# Patient Record
Sex: FEMALE | Race: WHITE | NOT HISPANIC OR LATINO | Employment: FULL TIME | URBAN - METROPOLITAN AREA
[De-identification: names, ages, dates, MRNs, and addresses within clinical notes are randomized per-mention and may not be internally consistent; named-entity substitution may affect disease eponyms.]

---

## 2017-04-25 ENCOUNTER — OCCUPATIONAL MEDICINE (OUTPATIENT)
Dept: URGENT CARE | Facility: PHYSICIAN GROUP | Age: 22
End: 2017-04-25
Payer: COMMERCIAL

## 2017-04-25 VITALS
OXYGEN SATURATION: 98 % | BODY MASS INDEX: 20.09 KG/M2 | HEIGHT: 66 IN | WEIGHT: 125 LBS | SYSTOLIC BLOOD PRESSURE: 120 MMHG | HEART RATE: 89 BPM | RESPIRATION RATE: 12 BRPM | TEMPERATURE: 98.7 F | DIASTOLIC BLOOD PRESSURE: 58 MMHG

## 2017-04-25 DIAGNOSIS — S46.812A STRAIN OF LEFT TRAPEZIUS MUSCLE, INITIAL ENCOUNTER: Primary | ICD-10-CM
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
## 2017-04-25 PROCEDURE — 99214 OFFICE O/P EST MOD 30 MIN: CPT | Mod: 29 | Performed by: PHYSICIAN ASSISTANT

## 2017-04-25 RX ORDER — CYCLOBENZAPRINE HCL 5 MG
5-10 TABLET ORAL 3 TIMES DAILY PRN
Qty: 30 TAB | Refills: 0 | Status: SHIPPED | OUTPATIENT
Start: 2017-04-25 | End: 2017-09-21

## 2017-04-25 RX ORDER — TRAMADOL HYDROCHLORIDE 50 MG/1
50 TABLET ORAL EVERY 4 HOURS PRN
Qty: 30 TAB | Refills: 0 | Status: SHIPPED | OUTPATIENT
Start: 2017-04-25 | End: 2017-09-21

## 2017-04-25 NOTE — MR AVS SNAPSHOT
"        Niki Painter   2017 4:45 PM   Occupational Medicine   MRN: [id number]    Department:  [facility]   Dept Phone:  [phone]    Description:  Female : 1995   Provider:  Guillermo Dos Santos PA-C           Reason for Visit     Neck Injury left side of neck, left shoulder pain and upper left arm pain x 1 day      Allergies as of 2017     No Known Allergies      You were diagnosed with     Strain of left trapezius muscle, initial encounter   [492700]  -  Primary     Work related injury   [[id number]]         Vital Signs     Blood Pressure Pulse Temperature Respirations Height Weight    120/58 mmHg 89 37.1 °C (98.7 °F) 12 1.676 m (5' 5.98\") 56.7 kg (125 lb)    Body Mass Index Oxygen Saturation Last Menstrual Period Smoking Status          20.19 kg/m2 98% 2017 Never Smoker         Basic Information     Date Of Birth Sex Race Ethnicity Preferred Language    1995 Female White Non- English      Your appointments     2017  8:45 AM   Workers Compensation with [facility]   [facility] [address] [address]   [phone]              Problem List              ICD-10-CM Priority Class Noted - Resolved    LUQ abdominal pain R10.12   2014 - Present    Hx of iron deficiency anemia Z86.2   2014 - Present      Health Maintenance        Date Due Completion Dates    IMM HEP B VACCINE (1 of 3 - Primary Series) 1995 ---    IMM HEP A VACCINE (1 of 2 - Standard Series) 1996 ---    IMM HPV VACCINE (1 of 3 - Female 3 Dose Series) 2006 ---    IMM VARICELLA (CHICKENPOX) VACCINE (1 of 2 - 2 Dose Adolescent Series) 2008 ---    IMM MENINGOCOCCAL VACCINE (MCV4) (1 of 1) 2011 ---    IMM DTaP/Tdap/Td Vaccine (1 - Tdap) 2014 ---    PAP SMEAR 2016 ---            Current Immunizations     No immunizations on file.      Below and/or attached are the medications your provider " expects you to take. Review all of your home medications and newly ordered medications with your provider and/or pharmacist. Follow medication instructions as directed by your provider and/or pharmacist. Please keep your medication list with you and share with your provider. Update the information when medications are discontinued, doses are changed, or new medications (including over-the-counter products) are added; and carry medication information at all times in the event of emergency situations     Allergies:  No Known Allergies          Medications  Valid as of: April 25, 2017 -  7:01 PM    Generic Name Brand Name Tablet Size Instructions for use    Cyclobenzaprine HCl (Tab) FLEXERIL 5 MG Take 1-2 Tabs by mouth 3 times a day as needed.        Norethindrone-Eth Estradiol (Tab) NECON 1/35 (28) 1-35 MG-MCG         Norethindrone-Eth Estradiol   Take  by mouth.        TraMADol HCl (Tab) ULTRAM 50 MG Take 1 Tab by mouth every four hours as needed.        .                 Medicines prescribed today were sent to:     [facility] [address] [place] [address] [place] [address] AT [facility] [address]    [address] [facility] [address]    Phone: [phone] Fax: [phone]    Open 24 Hours?: No      Medication refill instructions:       If your prescription bottle indicates you have medication refills left, it is not necessary to call your provider’s office. Please contact your pharmacy and they will refill your medication.    If your prescription bottle indicates you do not have any refills left, you may request refills at any time through one of the following ways: The online [employer] system (except Urgent Care), by calling your provider’s office, or by asking your pharmacy to contact your provider’s office with a refill request. Medication refills are processed only during regular business hours and may not be available until the next business day. Your provider may request additional information or to have a  follow-up visit with you prior to refilling your medication.   *Please Note: Medication refills are assigned a new Rx number when refilled electronically. Your pharmacy may indicate that no refills were authorized even though a new prescription for the same medication is available at the pharmacy. Please request the medicine by name with the pharmacy before contacting your provider for a refill.        Instructions    RICE for Routine Care of Injuries  The routine care of many injuries includes rest, ice, compression, and elevation (RICE). The RICE strategy is often recommended for injuries to soft tissues, such as a muscle strain, ligament injuries, bruises, and overuse injuries. It can also be used for some bony injuries. Using the RICE strategy can help to relieve pain, lessen swelling, and enable your body to heal.  Rest  Rest is required to allow your body to heal. This usually involves reducing your normal activities and avoiding use of the injured part of your body. Generally, you can return to your normal activities when you are comfortable and have been given permission by your health care provider.  Ice  Icing your injury helps to keep the swelling down, and it lessens pain. Do not apply ice directly to your skin.  · Put ice in a plastic bag.  · Place a towel between your skin and the bag.  · Leave the ice on for 20 minutes, 2-3 times a day.  Do this for as long as you are directed by your health care provider.  Compression  Compression means putting pressure on the injured area. Compression helps to keep swelling down, gives support, and helps with discomfort. Compression may be done with an elastic bandage. If an elastic bandage has been applied, follow these general tips:  · Remove and reapply the bandage every 3-4 hours or as directed by your health care provider.  · Make sure the bandage is not wrapped too tightly, because this can cut off circulation. If part of your body beyond the bandage becomes  blue, numb, cold, swollen, or more painful, your bandage is most likely too tight. If this occurs, remove your bandage and reapply it more loosely.  · See your health care provider if the bandage seems to be making your problems worse rather than better.  Elevation  Elevation means keeping the injured area raised. This helps to lessen swelling and decrease pain. If possible, your injured area should be elevated at or above the level of your heart or the center of your chest.  WHEN SHOULD I SEEK MEDICAL CARE?  You should seek medical care if:  · Your pain and swelling continue.  · Your symptoms are getting worse rather than improving.  These symptoms may indicate that further evaluation or further X-rays are needed. Sometimes, X-rays may not show a small broken bone (fracture) until a number of days later. Make a follow-up appointment with your health care provider.  WHEN SHOULD I SEEK IMMEDIATE MEDICAL CARE?  You should seek immediate medical care if:  · You have sudden severe pain at or below the area of your injury.  · You have redness or increased swelling around your injury.  · You have tingling or numbness at or below the area of your injury that does not improve after you remove the elastic bandage.     This information is not intended to replace advice given to you by your health care provider. Make sure you discuss any questions you have with your health care provider.     Document Released: 04/01/2002 Document Revised: 12/23/2014 Document Reviewed: 11/25/2015  [employer] Interactive Patient Education ©2016 Elsevier Inc.            V[employer]see Access Code: [email or web address]  Expires: 5/25/2017  7:01 PM    Viddsee  A secure, online tool to manage your health information     [employer]® is a secure, online tool that connects you to your personalized health information from the privacy of your home -- day or night - making it very easy for you to manage your healthcare. Once the activation process is  completed, you can even access your medical information using the [facility] polo, which is available for free in the Apple Polo store or Google Play store.     [facility] provides the following levels of access (as shown below):   My Chart Features   [facility] Doctor [facility]  Specialists [facility]  Urgent  Care Non-[facility]  Primary Care  Doctor   Email your healthcare team securely and privately 24/7 X X X    Manage appointments: schedule your next appointment; view details of past/upcoming appointments X      Request prescription refills. X      View recent personal medical records, including lab and immunizations X X X X   View health record, including health history, allergies, medications X X X X   Read reports about your outpatient visits, procedures, consult and ER notes X X X X   See your discharge summary, which is a recap of your hospital and/or ER visit that includes your diagnosis, lab results, and care plan. X X       How to register for [facility]:  1. Go to  [email or web address].  2. Click on the Sign Up Now box, which takes you to the New Member Sign Up page. You will need to provide the following information:  a. Enter your [facility] Access Code exactly as it appears at the top of this page. (You will not need to use this code after you’ve completed the sign-up process. If you do not sign up before the expiration date, you must request a new code.)   b. Enter your date of birth.   c. Enter your home email address.   d. Click Submit, and follow the next screen’s instructions.  3. Create a [facility] ID. This will be your [facility] login ID and cannot be changed, so think of one that is secure and easy to remember.  4. Create a [facility] password. You can change your password at any time.  5. Enter your Password Reset Question and Answer. This can be used at a later time if you forget your password.   6. Enter your e-mail address. This allows you to receive e-mail notifications when new information is  available in [employer].  7. Click Sign Up. You can now view your health information.    For assistance activating your [employer] account, call [phone]

## 2017-04-25 NOTE — Clinical Note
"EMPLOYEE’S CLAIM FOR COMPENSATION/ REPORT OF INITIAL TREATMENT  FORM C-4    EMPLOYEE’S CLAIM - PROVIDE ALL INFORMATION REQUESTED   First Name  Niki Last Name  Inocencio Birthdate                    1995                Sex  female Claim Number   Home Address  [address] Age  21 y.o. 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 (if applicable)  PUSHING HEAVY MATTRESS PADS DOWN ROLLER LINE WHILE DOING TRUCK FREIGHT    How did this injury or occupational disease occur? (Be specific an answer in detail. Use additional sheet if necessary)  I WAS DOING TRUCK FREIGHT AND WE HAD HEAVY MATTRESS PADS AND I PUSHED THEM DOWN THE LINE AND MY SHOULDER AND ARM GOT A SHARP PAIN AND SHOULDER/NECK   If you believe that you have an occupational disease, when did you first have knowledge of the disability and it relationship to your employment?  N/A Witnesses to the Accident  K[place]LEY      Nature of Injury or Occupational Disease  Strain  Part(s) of Body Injured or Affected  Shoulder (L), Soft Tissue - Neck,     I certify that " the above is true and correct to the best of my knowledge and that I have provided this information in order to obtain the benefits of [place]  I hereby authorize any physician, chiropractor, surgeon, practitioner, or other person, any hospital, including [facility] or [facility], any medical service organization, any insurance company, or other institution or organization to release to each other, any medical or other information, including benefits paid or payable, pertinent to this injury or disease, except information relative to diagnosis, treatment and/or counseling for AIDS, psychological conditions, alcohol or controlled substances, for which I must give specific authorization.  A Photostat of this authorization shall be as valid as the original.     Date   Place   Employee’s Signature   THIS REPORT MUST BE COMPLETED AND MAILED WITHIN 3 WORKING DAYS OF TREATMENT   Place  [facility]  Name of Facility  [facility]   Date  4/25/2017 Diagnosis  (S46.812A) Strain of left trapezius muscle, initial encounter  (primary encounter diagnosis)  (Y99.0) Work related injury Is there evidence the injured employee was under the influence of alcohol and/or another controlled substance at the time of accident?   Hour  5:09 PM Description of Injury or Disease  The primary encounter diagnosis was Strain of left trapezius muscle, initial encounter. A diagnosis of Work related injury was also pertinent to this visit. No   Treatment  Rest, ice, gentle ROM, OTC ibuprofen and RX ultram and Rx flexeril for pain and neck spasms  Have you advised the patient to remain off work five days or more? No   X-Ray Findings    Comments:n/a   If Yes   From Date  To Date      From information given by the employee, together with medical evidence, can you directly connect this injury or occupational  "disease as job incurred?  Yes If No Full Duty  No Modified Duty  Yes   Is additional medical care by a physician indicated?  Yes If Modified Duty, Specify any Limitations / Restrictions  SEE RESTRICTIONS   Do you know of any previous injury or disease contributing to this condition or occupational disease?                            No   Date  4/25/2017 Print Doctor’s Name Doug Silvestre PA-C I certify the employer’s copy of  this form was mailed on:   Address  [address]     [facility] [place]  [address]    Provider’s Tax ID Number  [id number]  Telephone  Dept: [phone]        e-SignDOUG SILVESTRE PA-C   e-Signature: Dr. Venkatesh Brewer, Medical Director Degree  CHERYL        ORIGINAL-TREATING PHYSICIAN OR CHIROPRACTOR    PAGE 2-INSURER/TPA    PAGE 3-EMPLOYER    PAGE 4-EMPLOYEE             Form C-4 (rev10/07)              BRIEF DESCRIPTION OF RIGHTS AND BENEFITS  (Pursuant to [place]RS 616C.050)    Notice of Injury or Occupational Disease (Incident Report Form C-1): If an injury or occupational disease (OD) arises out of and in the  course of employment, you must provide written notice to your employer as soon as practicable, but no later than 7 days after the accident or  OD. Your employer shall maintain a sufficient supply of the required forms.    Claim for Compensation (Form C-4): If medical treatment is sought, the form C-4 is available at the place of initial treatment. A completed  \"Claim for Compensation\" (Form C-4) must be filed within 90 days after an accident or OD. The treating physician or chiropractor must,  within 3 working days after treatment, complete and mail to the employer, the employer's insurer and third-party , the Claim for  Compensation.    Medical Treatment: If you require medical treatment for your on-the-job injury or OD, you may be required to select a physician or  chiropractor from a list provided by your workers’ compensation " insurer, if it has contracted with an Organization for Managed Care (MCO) or  Preferred Provider Organization (PPO) or providers of health care. If your employer has not entered into a contract with an MCO or PPO, you  may select a physician or chiropractor from the Panel of Physicians and Chiropractors. Any medical costs related to your industrial injury or  OD will be paid by your insurer.    Temporary Total Disability (TTD): If your doctor has certified that you are unable to work for a period of at least 5 consecutive days, or 5  cumulative days in a 20-day period, or places restrictions on you that your employer does not accommodate, you may be entitled to TTD  compensation.    Temporary Partial Disability (TPD): If the wage you receive upon reemployment is less than the compensation for TTD to which you are  entitled, the insurer may be required to pay you TPD compensation to make up the difference. TPD can only be paid for a maximum of 24  months.    Permanent Partial Disability (PPD): When your medical condition is stable and there is an indication of a PPD as a result of your injury or  OD, within 30 days, your insurer must arrange for an evaluation by a rating physician or chiropractor to determine the degree of your PPD. The  amount of your PPD award depends on the date of injury, the results of the PPD evaluation and your age and wage.    Permanent Total Disability (PTD): If you are medically certified by a treating physician or chiropractor as permanently and totally disabled  and have been granted a PTD status by your insurer, you are entitled to receive monthly benefits not to exceed 66 2/3% of your average  monthly wage. The amount of your PTD payments is subject to reduction if you previously received a PPD award.    Vocational Rehabilitation Services: You may be eligible for vocational rehabilitation services if you are unable to return to the job due to a  permanent physical impairment or  permanent restrictions as a result of your injury or occupational disease.    Transportation and Per Ramon Reimbursement: You may be eligible for travel expenses and per ramon associated with medical treatment.    Reopening: You may be able to reopen your claim if your condition worsens after claim closure.    Appeal Process: If you disagree with a written determination issued by the insurer or the insurer does not respond to your request, you may  appeal to the Department of Administration, , by following the instructions contained in your determination letter. You must  appeal the determination within 70 days from the date of the determination letter at [address], [address], [place], [place]  [address], or [address], [address], [place], [place] [address]. If you disagree with the  decision, you may appeal to the  Department of Administration, . You must file your appeal within 30 days from the date of the  decision  letter at [address], [address], [place], [place] [address], or [address], [facility] [facility] [address], [place], [place] [address]. If you  disagree with a decision of an , you may file a petition for judicial review with the District Court. You must do so within 30  days of the Appeal Officer’s decision. You may be represented by an  at your own expense or you may contact the [facility] for possible  representation.    [place]  for Injured Workers (NAIW): If you disagree with a  decision, you may request that NAIW represent you  without charge at an  Hearing. For information regarding denial of benefits, you may contact the [facility] at: [address], [address] [address], [phone], or [address], [address], [place] [address], [phone]    To File a Complaint with the Division: If you wish to file a complaint with the   of the Division of Industrial Relations (DIR),  please contact the Workers’ Compensation Section, [address], [address], [place], [place] [address], telephone [phone]) [phone], or  [address], [address], [place], [place] [address], telephone [phone].    For assistance with Workers’ Compensation Issues: you may contact the Office of the Governor Consumer Health Assistance, [address] [place] [address], [address], [place], [place] [address], Toll Free 1-212.730.2947, Web site: http://govcha.[facility].nv., E-mail  [email or web address].                                                                                                                                                                                                                                   __________________________________________________________________                                                                   _________________                Employee Name / Signature                                                                                                                                                       Date                                                                                                                                                                                                     D-2 (rev. 10/07)

## 2017-04-25 NOTE — Clinical Note
[facility] [address] [place] [address]  Phone: [phone] - Fax: [phone]        [facility] Progress Report and Disability Certification  Date of Service: 4/25/2017   No Show:  No  Date / Time of Next Visit: 4/28/2017 8:45 AM   Claim Information   Patient Name: Niki Painter  Claim Number:     Employer: [employer] Date of Injury: 4/24/2017     Insurer / TPA: [employer]  ID / SSN:     Occupation: SHOE SUPERVISOR  Diagnosis: The primary encounter diagnosis was Strain of left trapezius muscle, initial encounter. A diagnosis of Work related injury was also pertinent to this visit.    Medical Information   Related to Industrial Injury?      Subjective Complaints:  DOI: 4/24/17  Pt notes she was pushing heavy mattress pads off a [employer] truck and injured her left shoulder and neck muscles with throbbing and tightness and spasms. Pt denies second job. Pt has not taken any Rx medications for this condition. Pt states the pain is a 8/10 and OTC NSAIDs are not helping her, aching in nature and worse at night. Pt denies CP, SOB, NVD, paresthesias, headaches, dizziness, change in vision, hives, or other joint pain. The pt's medication list, problem list, and allergies have been evaluated and reviewed during today's visit.      Objective Findings: Left shoulder: Upon inspection, no ecchymoses, no edema, no erythema. +TTP along left trapezius muscles with spasms, +AROM, +SILT, STR 3/5, pulses 2+ B/L      Pre-Existing Condition(s):     Assessment:   Initial Visit    Status: Additional Care Required  Permanent Disability:     Plan: MedicationMedication (NOT at Work)  Comments:OTC ibuprofen and RX ultram and RX flexeril    Diagnostics:      Comments:       Disability Information   Status: Released to Restricted Duty    From:  4/25/2017  Through: 4/28/2017 Restrictions are: Temporary   Physical Restrictions   Sitting:    Standing:   Stoopin hrs/day Bendin hrs/day   Squattin hrs/day Walking:    Climbin hrs/day Pushing:  < or = to 1 hr/day   Pulling:  < or = to 1 hr/day Other:    Reaching Above Shoulder (L):   Reaching Above Shoulder (R):       Reaching Below Shoulder (L):    Reaching Below Shoulder (R):      Not to exceed Weight Limits   Carrying(hrs):   Weight Limit(lb): < or = to 10 pounds Lifting(hrs):   Weight  Limit(lb): < or = to 10 pounds   Comments:      Repetitive Actions   Hands: i.e. Fine Manipulations from Grasping:     Feet: i.e. Operating Foot Controls:     Driving / Operate Machinery:     Physician Name: Doug Dos Santos PA-C Physician Signature: DUOG Hackett PA-C e-Signature: Dr. Venkatesh Brewer, Medical Director   Clinic Name / Location: [address] [address] Clinic Phone Number: Dept: [phone]   Appointment Time: 4:45 Pm Visit Start Time: 5:09 PM   Check-In Time:  4:58 Pm Visit Discharge Time: 5:46 PM   Original-Treating Physician or Chiropractor    Page 2-Insurer/TPA    Page 3-Employer    Page 4-Employee

## 2017-04-26 NOTE — PATIENT INSTRUCTIONS
RICE for Routine Care of Injuries  The routine care of many injuries includes rest, ice, compression, and elevation (RICE). The RICE strategy is often recommended for injuries to soft tissues, such as a muscle strain, ligament injuries, bruises, and overuse injuries. It can also be used for some bony injuries. Using the RICE strategy can help to relieve pain, lessen swelling, and enable your body to heal.  Rest  Rest is required to allow your body to heal. This usually involves reducing your normal activities and avoiding use of the injured part of your body. Generally, you can return to your normal activities when you are comfortable and have been given permission by your health care provider.  Ice  Icing your injury helps to keep the swelling down, and it lessens pain. Do not apply ice directly to your skin.  · Put ice in a plastic bag.  · Place a towel between your skin and the bag.  · Leave the ice on for 20 minutes, 2-3 times a day.  Do this for as long as you are directed by your health care provider.  Compression  Compression means putting pressure on the injured area. Compression helps to keep swelling down, gives support, and helps with discomfort. Compression may be done with an elastic bandage. If an elastic bandage has been applied, follow these general tips:  · Remove and reapply the bandage every 3-4 hours or as directed by your health care provider.  · Make sure the bandage is not wrapped too tightly, because this can cut off circulation. If part of your body beyond the bandage becomes blue, numb, cold, swollen, or more painful, your bandage is most likely too tight. If this occurs, remove your bandage and reapply it more loosely.  · See your health care provider if the bandage seems to be making your problems worse rather than better.  Elevation  Elevation means keeping the injured area raised. This helps to lessen swelling and decrease pain. If possible, your injured area should be elevated at or  above the level of your heart or the center of your chest.  WHEN SHOULD I SEEK MEDICAL CARE?  You should seek medical care if:  · Your pain and swelling continue.  · Your symptoms are getting worse rather than improving.  These symptoms may indicate that further evaluation or further X-rays are needed. Sometimes, X-rays may not show a small broken bone (fracture) until a number of days later. Make a follow-up appointment with your health care provider.  WHEN SHOULD I SEEK IMMEDIATE MEDICAL CARE?  You should seek immediate medical care if:  · You have sudden severe pain at or below the area of your injury.  · You have redness or increased swelling around your injury.  · You have tingling or numbness at or below the area of your injury that does not improve after you remove the elastic bandage.     This information is not intended to replace advice given to you by your health care provider. Make sure you discuss any questions you have with your health care provider.     Document Released: 04/01/2002 Document Revised: 12/23/2014 Document Reviewed: 11/25/2015  [employer] Interactive Patient Education ©2016 Elsevier Inc.

## 2017-04-26 NOTE — PROGRESS NOTES
Subjective:      Pt is a 21 y.o. female who presents with Neck Injury      DOI: 4/24/17  Pt notes she was pushing heavy mattress pads off a [employer] truck and injured her left shoulder and neck muscles with throbbing and tightness and spasms. Pt denies second job. Pt has not taken any Rx medications for this condition. Pt states the pain is a 8/10 and OTC NSAIDs are not helping her, aching in nature and worse at night. Pt denies CP, SOB, NVD, paresthesias, headaches, dizziness, change in vision, hives, or other joint pain. The pt's medication list, problem list, and allergies have been evaluated and reviewed during today's visit.        HPI  PMH:  Negative per pt.      PSH:  Negative per pt.      Fam Hx:    family history includes Genetic in her maternal grandmother.  Family Status   Relation Status Death Age   • Mother Alive    • Father Alive        Soc HX:  Social History     Social History   • Marital Status: Single     Spouse Name: N/A   • Number of Children: N/A   • Years of Education: N/A     Occupational History   • Not on file.     Social History Main Topics   • Smoking status: Never Smoker    • Smokeless tobacco: Not on file   • Alcohol Use: No   • Drug Use: No   • Sexual Activity: Not on file      Comment: In relationship     Other Topics Concern   • Not on file     Social History Narrative         Medications:    Current outpatient prescriptions:   •  Norethindrone-Eth Estradiol (NORTREL 1/35, 21, PO), Take  by mouth., Disp: , Rfl:   •  cyclobenzaprine (FLEXERIL) 5 MG tablet, Take 1-2 Tabs by mouth 3 times a day as needed., Disp: 30 Tab, Rfl: 0  •  tramadol (ULTRAM) 50 MG Tab, Take 1 Tab by mouth every four hours as needed., Disp: 30 Tab, Rfl: 0  •  NECON 1/35, 28, 1-35 MG-MCG per tablet, , Disp: , Rfl:       Allergies:  Review of patient's allergies indicates no known allergies.      ROS  Constitutional: Negative for fever, chills and malaise/fatigue.   HENT: Negative for congestion and sore throat.   "  Eyes: Negative for blurred vision, double vision and photophobia.   Respiratory: Negative for cough and shortness of breath.    Cardiovascular: Negative for chest pain and palpitations.   Gastrointestinal: Negative for heartburn, nausea, vomiting, abdominal pain, diarrhea and constipation.   Genitourinary: Negative for dysuria and flank pain.   Musculoskeletal: POS for left neck and trapezius and myalgias.   Skin: Negative for itching and rash.   Neurological: Negative for dizziness, tingling and headaches.   Endo/Heme/Allergies: Does not bruise/bleed easily.   Psychiatric/Behavioral: Negative for depression. The patient is not nervous/anxious.           Objective:     /58 mmHg  Pulse 89  Temp(Src) 37.1 °C (98.7 °F)  Resp 12  Ht 1.676 m (5' 5.98\")  Wt 56.7 kg (125 lb)  BMI 20.19 kg/m2  SpO2 98%  LMP 04/18/2017     Physical Exam    Left shoulder: Upon inspection, no ecchymoses, no edema, no erythema. +TTP along left trapezius muscles with spasms, +AROM, +SILT, STR 3/5, pulses 2+ B/L     Constitutional: PT is oriented to person, place, and time. PT appears well-developed and well-nourished. No distress.   HENT:   Head: Normocephalic and atraumatic.   Mouth/Throat: Oropharynx is clear and moist. No oropharyngeal exudate.   Eyes: Conjunctivae normal and EOM are normal. Pupils are equal, round, and reactive to light.   Neck: Normal range of motion. Neck supple. No thyromegaly present.   Cardiovascular: Normal rate, regular rhythm, normal heart sounds and intact distal pulses.  Exam reveals no gallop and no friction rub.    No murmur heard.  Pulmonary/Chest: Effort normal and breath sounds normal. No respiratory distress. PT has no wheezes. PT has no rales. Pt exhibits no tenderness.   Abdominal: Soft. Bowel sounds are normal. PT exhibits no distension and no mass. There is no tenderness. There is no rebound and no guarding.   Neurological: PT is alert and oriented to person, place, and time. PT has normal " reflexes. No cranial nerve deficit.   Skin: Skin is warm and dry. No rash noted. PT is not diaphoretic. No erythema.       Psychiatric: PT has a normal mood and affect. PT behavior is normal. Judgment and thought content normal.        Assessment/Plan:     1. Strain of left trapezius muscle, initial encounter    - cyclobenzaprine (FLEXERIL) 5 MG tablet; Take 1-2 Tabs by mouth 3 times a day as needed.  Dispense: 30 Tab; Refill: 0  - tramadol (ULTRAM) 50 MG Tab; Take 1 Tab by mouth every four hours as needed.  Dispense: 30 Tab; Refill: 0    2. Work related injury      [place]  Aware web site evaluation: I have obtained and reviewed patient utilization report from [facility] pharmacy database prior to writing prescription for controlled substance II, III or IV per [place] bill . Based on the report and my clinical assessment the prescription is medically necessary.  Pt notes pain 8/10 with no improvement on OTC NSAIDs   NSAIDs for pain 1-5, Ultram for pain 6-10 or to help get to sleep.  RICE therapy discussed  Gentle ROM exercises discussed  WBAT left UE  Ice/heat therapy discussed  Rest, fluids encouraged.  AVS with medical info given.  Pt was in full understanding and agreement with the plan.  Follow-up in 3 days for WC visit

## 2017-04-28 ENCOUNTER — OCCUPATIONAL MEDICINE (OUTPATIENT)
Dept: URGENT CARE | Facility: PHYSICIAN GROUP | Age: 22
End: 2017-04-28
Payer: COMMERCIAL

## 2017-04-28 VITALS
WEIGHT: 123 LBS | OXYGEN SATURATION: 98 % | DIASTOLIC BLOOD PRESSURE: 78 MMHG | RESPIRATION RATE: 16 BRPM | SYSTOLIC BLOOD PRESSURE: 102 MMHG | BODY MASS INDEX: 19.86 KG/M2 | HEART RATE: 93 BPM | TEMPERATURE: 100.2 F

## 2017-04-28 DIAGNOSIS — S46.812D STRAIN OF LEFT TRAPEZIUS MUSCLE, SUBSEQUENT ENCOUNTER: ICD-10-CM

## 2017-04-28 PROCEDURE — 99213 OFFICE O/P EST LOW 20 MIN: CPT | Mod: 29 | Performed by: NURSE PRACTITIONER

## 2017-04-28 ASSESSMENT — ENCOUNTER SYMPTOMS
CHILLS: 0
MYALGIAS: 1
FEVER: 0
BRUISES/BLEEDS EASILY: 0
FALLS: 0
TINGLING: 0
WEAKNESS: 0
SENSORY CHANGE: 0

## 2017-04-28 NOTE — MR AVS SNAPSHOT
Niki Gallo [place]   2017 8:45 AM   Occupational Medicine   MRN: [id number]    Department:  [facility]   Dept Phone:  [phone]    Description:  Female : 1995   Provider:  WILVER Gallo           Reason for Visit     Shoulder Injury WC F/V left shoulder injury      Allergies as of 2017     No Known Allergies      You were diagnosed with     Strain of left trapezius muscle, subsequent encounter   [[id number]]         Vital Signs     Blood Pressure Pulse Temperature Respirations Weight Oxygen Saturation    102/78 mmHg 93 37.9 °C (100.2 °F) 16 55.792 kg (123 lb) 98%    Last Menstrual Period Smoking Status                2017 Never Smoker           Basic Information     Date Of Birth Sex Race Ethnicity Preferred Language    1995 Female White Non- English      Your appointments     May 02, 2017 12:00 PM   Workers Compensation with [facility]   [facility] [address] [id number] [phone]              Problem List              ICD-10-CM Priority Class Noted - Resolved    LUQ abdominal pain R10.12   2014 - Present    Hx of iron deficiency anemia Z86.2   2014 - Present      Health Maintenance        Date Due Completion Dates    IMM HEP B VACCINE (1 of 3 - Primary Series) 1995 ---    IMM HEP A VACCINE (1 of 2 - Standard Series) 1996 ---    IMM HPV VACCINE (1 of 3 - Female 3 Dose Series) 2006 ---    IMM VARICELLA (CHICKENPOX) VACCINE (1 of 2 - 2 Dose Adolescent Series) 2008 ---    IMM MENINGOCOCCAL VACCINE (MCV4) (1 of 1) 2011 ---    IMM DTaP/Tdap/Td Vaccine (1 - Tdap) 2014 ---    PAP SMEAR 2016 ---            Current Immunizations     No immunizations on file.      Below and/or attached are the medications your provider expects you to take. Review all of your home medications and newly ordered medications with your provider and/or pharmacist.  Follow medication instructions as directed by your provider and/or pharmacist. Please keep your medication list with you and share with your provider. Update the information when medications are discontinued, doses are changed, or new medications (including over-the-counter products) are added; and carry medication information at all times in the event of emergency situations     Allergies:  No Known Allergies          Medications  Valid as of: April 28, 2017 - 10:00 AM    Generic Name Brand Name Tablet Size Instructions for use    Cyclobenzaprine HCl (Tab) FLEXERIL 5 MG Take 1-2 Tabs by mouth 3 times a day as needed.        Norethindrone-Eth Estradiol (Tab) NECON 1/35 (28) 1-35 MG-MCG         Norethindrone-Eth Estradiol   Take  by mouth.        TraMADol HCl (Tab) ULTRAM 50 MG Take 1 Tab by mouth every four hours as needed.        .                 Medicines prescribed today were sent to:     [facility] [id number] [place] [address], [place] - [address] AT [facility]    [address] [place] [address]    Phone: [phone] Fax: [phone]    Open 24 Hours?: No      Medication refill instructions:       If your prescription bottle indicates you have medication refills left, it is not necessary to call your provider’s office. Please contact your pharmacy and they will refill your medication.    If your prescription bottle indicates you do not have any refills left, you may request refills at any time through one of the following ways: The online PlayScape system (except Urgent Care), by calling your provider’s office, or by asking your pharmacy to contact your provider’s office with a refill request. Medication refills are processed only during regular business hours and may not be available until the next business day. Your provider may request additional information or to have a follow-up visit with you prior to refilling your medication.   *Please Note: Medication refills are assigned a new Rx number  when refilled electronically. Your pharmacy may indicate that no refills were authorized even though a new prescription for the same medication is available at the pharmacy. Please request the medicine by name with the pharmacy before contacting your provider for a refill.           Sproom Access Code: [email or web address]  Expires: 5/25/2017  7:01 PM    Sproom  A secure, online tool to manage your health information     [employer]’s Sproom® is a secure, online tool that connects you to your personalized health information from the privacy of your home -- day or night - making it very easy for you to manage your healthcare. Once the activation process is completed, you can even access your medical information using the Sproom polo, which is available for free in the Apple Polo store or Google Play store.     Sproom provides the following levels of access (as shown below):   My Chart Features   [facility] Doctor [facility]  Specialists [facility]  Urgent  Care Non-[facility]  Primary Care  Doctor   Email your healthcare team securely and privately 24/7 X X X    Manage appointments: schedule your next appointment; view details of past/upcoming appointments X      Request prescription refills. X      View recent personal medical records, including lab and immunizations X X X X   View health record, including health history, allergies, medications X X X X   Read reports about your outpatient visits, procedures, consult and ER notes X X X X   See your discharge summary, which is a recap of your hospital and/or ER visit that includes your diagnosis, lab results, and care plan. X X       How to register for Sproom:  1. Go to  [email or web address].  2. Click on the Sign Up Now box, which takes you to the New Member Sign Up page. You will need to provide the following information:  a. Enter your Sproom Access Code exactly as it appears at the top of this page. (You will not need to use this code after you’ve  completed the sign-up process. If you do not sign up before the expiration date, you must request a new code.)   b. Enter your date of birth.   c. Enter your home email address.   d. Click Submit, and follow the next screen’s instructions.  3. Create a Color [employer]t ID. This will be your Color [employer]t login ID and cannot be changed, so think of one that is secure and easy to remember.  4. Create a [employer] password. You can change your password at any time.  5. Enter your Password Reset Question and Answer. This can be used at a later time if you forget your password.   6. Enter your e-mail address. This allows you to receive e-mail notifications when new information is available in [employer].  7. Click Sign Up. You can now view your health information.    For assistance activating your [employer] account, call [phone]

## 2017-04-28 NOTE — Clinical Note
"   [facility] [address] [address]  Phone: [phone] - Fax: [phone]        [facility] Progress Report and Disability Certification  Date of Service: 4/28/2017   No Show:  No  Date / Time of Next Visit: 5/2/2017   Claim Information   Patient Name: Niki Painter  Claim Number:     Employer:   [employer] Date of Injury: 4/24/2017     Insurer / TPA: [employer]  ID / SSN:     Occupation: SHOE SUPERVISOR  Diagnosis: The encounter diagnosis was Strain of left trapezius muscle, subsequent encounter.    Medical Information   Related to Industrial Injury? Yes    Subjective Complaints:  DOI 4/24/17. 2nd encounter. Patient was pushing mattress pad off truck and had pain in left shoulder. States has been taking muscle relaxant at home which helps with muscle spasms as well as Tramadol 2x/day. States when pain is krystle it goes to 7/10 but will get down to 4/10. Denies numbness/tingling in left hand/arm. States decreased pain with holding arm up but once it is down at her side \"pulling\" of her left shoulder increases pain. Occasional warm application will help but cold application increases pain. No topical analgesic. Intermittent stretching of left shoulder joint which she will perform at work \"1-2x/hour\". States pain has become \"worse not better\".   Objective Findings: A/O x4. Skin p/w/d, skin sensation intact. FROM of left shoulder joint but discomfort at left side upper trapezius muscle and  medial aspect of left shoulder blade; TTP at top of left shoulder joint at trapezius muscle to lateral aspect of neck muscle up to base of skull; TTP at left side paraspinous muscle in between shoulder blades with muscle spasm felt, TTP at lateral aspect of mid back under left shoulder blade with muscle spasm. No crepitus, bruising, deformity of left shoulder.    Pre-Existing Condition(s):     Assessment:   Condition Worsened    Status: " "Additional Care Required  Permanent Disability:No    Plan:   Comments:May use Ibuprofen during work hours as needed for pain, then may use prescribed meds at home due to a drowsy effect    Diagnostics:      Comments:       Disability Information   Status: Released to Restricted Duty    From:  2017  Through: 2017 Restrictions are: Temporary   Physical Restrictions   Sitting:    Standing:    Stoopin hrs/day Bendin hrs/day   Squattin hrs/day Walking:    Climbing:    Pushing:  < or = to 1 hr/day   Pulling:  < or = to 1 hr/day Other:    Reaching Above Shoulder (L): < or = to 1 hr/day Reaching Above Shoulder (R):       Reaching Below Shoulder (L):  < or = to 1 hrs/day Reaching Below Shoulder (R):      Not to exceed Weight Limits   Carrying(hrs):   Weight Limit(lb): < or = to 10 pounds Lifting(hrs):   Weight  Limit(lb): < or = to 10 pounds   Comments: May take Ibuprofen at work as needed for pain, May use warm application as needed for muscle spasm followed by topical analgesic use and muscle stretching as tolerated, Use prescribed medications for symptoms when at home as directed. Use left arm sling while at work to avoid left shoulder \"hanging\" due to discomfort. Recheck on 17. If no improvement by next visit, may refer to [facility].    Repetitive Actions   Hands: i.e. Fine Manipulations from Grasping:     Feet: i.e. Operating Foot Controls:     Driving / Operate Machinery:     Physician Name: WILVER Gallo Physician Signature: KI Pisano e-Signature: Dr. Venkatesh Brewer, Medical Director   Clinic Name / Location: [facility] [address] [address] Clinic Phone Number: Dept: [phone]   Appointment Time: 8:45 Am Visit Start Time: 9:08 AM   Check-In Time:  8:45 Am Visit Discharge Time:  9:52 AM   Original-Treating Physician or Chiropractor    Page 2-Insurer/TPA    Page 3-Employer    Page 4-Employee      "

## 2017-04-28 NOTE — PROGRESS NOTES
"Subjective:      Niki Painter is a 21 y.o. female who presents with Shoulder Injury      DOI 4/24/17. 2nd encounter. Patient was pushing mattress pad off truck and had pain in left shoulder. States has been taking muscle relaxant at home which helps with muscle spasms as well as Tramadol 2x/day. States when pain is krystle it goes to 7/10 but will get down to 4/10. Denies numbness/tingling in left hand/arm. States decreased pain with holding arm up but once it is down at her side \"pulling\" of her left shoulder increases pain. Occasional warm application will help but cold application increases pain. No topical analgesic. Intermittent stretching of left shoulder joint which she will perform at work \"1-2x/hour\". States pain has become \"worse not better\".     Shoulder Injury   Pertinent negatives include no tingling.   see above PMH:  has no past medical history of Diabetes (CMS-[facility]).  MEDS:   Current outpatient prescriptions:   •  Norethindrone-Eth Estradiol (NORTREL 1/35, 21, PO), Take  by mouth., Disp: , Rfl:   •  cyclobenzaprine (FLEXERIL) 5 MG tablet, Take 1-2 Tabs by mouth 3 times a day as needed., Disp: 30 Tab, Rfl: 0  •  tramadol (ULTRAM) 50 MG Tab, Take 1 Tab by mouth every four hours as needed., Disp: 30 Tab, Rfl: 0  •  NECON 1/35, 28, 1-35 MG-MCG per tablet, , Disp: , Rfl:   ALLERGIES: No Known Allergies  SURGHX: History reviewed. No pertinent past surgical history.  SOCHX:  reports that she has never smoked. She does not have any smokeless tobacco history on file. She reports that she does not drink alcohol or use illicit drugs.  FH: Family history was reviewed, no pertinent findings to report      Review of Systems   Constitutional: Negative for fever, chills and malaise/fatigue.   Musculoskeletal: Positive for myalgias. Negative for joint pain and falls.   Neurological: Negative for tingling, sensory change and weakness.   Endo/Heme/Allergies: Does not bruise/bleed easily.   All other systems " reviewed and are negative.         Objective:     /78 mmHg  Pulse 93  Temp(Src) 37.9 °C (100.2 °F)  Resp 16  Wt 55.792 kg (123 lb)  SpO2 98%  LMP 04/18/2017     Physical Exam   Constitutional: She is oriented to person, place, and time. She appears well-developed and well-nourished. No distress.   HENT:   Head: Normocephalic.   Eyes: Conjunctivae and EOM are normal. Pupils are equal, round, and reactive to light.   Neck: Normal range of motion. Neck supple. Muscular tenderness present. No spinous process tenderness present. No rigidity. No edema, no erythema and normal range of motion present.       Cardiovascular: Normal rate.    Pulmonary/Chest: Effort normal.   Musculoskeletal:        Cervical back: She exhibits tenderness, pain and spasm. She exhibits normal range of motion, no bony tenderness, no swelling, no edema and no deformity.        Thoracic back: She exhibits tenderness, pain and spasm. She exhibits normal range of motion, no bony tenderness, no swelling, no edema and no deformity.        Back:    Neurological: She is alert and oriented to person, place, and time.   Skin: Skin is warm and dry. She is not diaphoretic.   Vitals reviewed.      A/O x4. Skin p/w/d, skin sensation intact. FROM of left shoulder joint but discomfort at left side upper trapezius muscle and  medial aspect of left shoulder blade; TTP at top of left shoulder joint at trapezius muscle to lateral aspect of neck muscle up to base of skull; TTP at left side paraspinous muscle in between shoulder blades with muscle spasm felt, TTP at lateral aspect of mid back under left shoulder blade with muscle spasm. No crepitus, bruising, deformity of left shoulder.        Assessment/Plan:     1. Strain of left trapezius muscle, subsequent encounter    Take Ibuprofen prn for discomfort  May use cool compresses for swelling and warm compresses for muscle stiffness   May perform muscle stretches as tolerated after warm compresses to  maintain mobility, avoid abdominal crunches  May continue Flexeril prn when at home only   May apply topical analgesics prn  Perform proper body mechanics with lifting, twisting, bending and reaching. Ask for assistance with heay objects  Monitor for numbness/tingling in upper extremities, decreased ROM with ambulation difficulty- need re-evaluation  Recheck 5/2/17

## 2017-05-02 ENCOUNTER — OCCUPATIONAL MEDICINE (OUTPATIENT)
Dept: URGENT CARE | Facility: PHYSICIAN GROUP | Age: 22
End: 2017-05-02
Payer: COMMERCIAL

## 2017-05-02 VITALS
SYSTOLIC BLOOD PRESSURE: 104 MMHG | WEIGHT: 126 LBS | BODY MASS INDEX: 20.35 KG/M2 | RESPIRATION RATE: 16 BRPM | DIASTOLIC BLOOD PRESSURE: 78 MMHG | TEMPERATURE: 99.1 F | HEART RATE: 80 BPM | OXYGEN SATURATION: 96 %

## 2017-05-02 DIAGNOSIS — S46.812D STRAIN OF LEFT TRAPEZIUS MUSCLE, SUBSEQUENT ENCOUNTER: ICD-10-CM

## 2017-05-02 PROCEDURE — 99213 OFFICE O/P EST LOW 20 MIN: CPT | Mod: 29 | Performed by: PHYSICIAN ASSISTANT

## 2017-05-02 NOTE — Clinical Note
[facility] [address] [address]  Phone: [phone] - Fax: [phone]        [facility] Progress Report and Disability Certification  Date of Service: 2017   No Show:  No  Date / Time of Next Visit: 2017 [facility]    Claim Information   Patient Name: Niki Painter  Claim Number:     Employer:   [employer] Date of Injury: 2017     Insurer / TPA: [employer]  ID / SSN:     Occupation: SHOE SUPERVISOR  Diagnosis: The encounter diagnosis was Strain of left trapezius muscle, subsequent encounter.    Medical Information   Related to Industrial Injury? Yes    Subjective Complaints:  DOI: 17. Left trapezius strain unimproved. Has been using heat and ice. Pain 8/10 at worst with certain movements. Taking Tramadol and flexeril with good relief. Been back to work but has been sent home early each shift. Complains of numbness/tinlging under shoulder blade.    Objective Findings: Tenderness over the medial scapular border of the left side. No midline tenderness. Pain extends up into the left trapezius region. Some spasm.  strength equal. Range of motion shoulder within normal limits. Range of motion neck within normal limits. Neurovascularly intact.   Pre-Existing Condition(s):     Assessment:   Condition Same    Status: Additional Care Required  Permanent Disability:No    Plan: Medication (NOT at Work)    Diagnostics:      Comments:       Disability Information   Status: Released to Restricted Duty    From:  2017  Through: 2017 Restrictions are: Temporary   Physical Restrictions   Sitting:    Standing:    Stooping:    Bending:      Squatting:    Walking:    Climbing:    Pushin hrs/day   Pullin hrs/day Other:    Reaching Above Shoulder (L): 0 hrs/day Reaching Above Shoulder (R):       Reaching Below Shoulder (L):  0 hrs/day Reaching Below Shoulder (R):      Not to exceed Weight Limits      Carrying(hrs):   Weight Limit(lb): < or = to 10 pounds Lifting(hrs):   Weight  Limit(lb): < or = to 10 pounds   Comments:      Repetitive Actions   Hands: i.e. Fine Manipulations from Grasping:     Feet: i.e. Operating Foot Controls:     Driving / Operate Machinery:     Physician Name: Isabell Jimenes PA-C Physician Signature: ISABELL Osorio PA-C e-Signature: Dr. Venkatesh Brewer, Medical Director   Clinic Name / Location: [address] [address] Clinic Phone Number: Dept: [phone]   Appointment Time: 12:00 Pm Visit Start Time: 12:02 PM   Check-In Time:  11:50 Am Visit Discharge Time: 12:22 Pm    Original-Treating Physician or Chiropractor    Page 2-Insurer/TPA    Page 3-Employer    Page 4-Employee

## 2017-05-02 NOTE — MR AVS SNAPSHOT
Niki Painter   2017 12:00 PM   Occupational Medicine   MRN: [id number]    Department:  [facility]   Dept Phone:  [phone]    Description:  Female : 1995   Provider:  Venkatesh Jimenes PA-C           Reason for Visit     Shoulder Injury WC F/V left shoulder injury      Allergies as of 2017     No Known Allergies      You were diagnosed with     Strain of left trapezius muscle, subsequent encounter   [[id number]]         Vital Signs     Blood Pressure Pulse Temperature Respirations Weight Oxygen Saturation    104/78 mmHg 80 37.3 °C (99.1 °F) 16 57.153 kg (126 lb) 96%    Last Menstrual Period Smoking Status                2017 Never Smoker           Basic Information     Date Of Birth Sex Race Ethnicity Preferred Language    1995 Female White Non- English      Problem List              ICD-10-CM Priority Class Noted - Resolved    LUQ abdominal pain R10.12   2014 - Present    Hx of iron deficiency anemia Z86.2   2014 - Present      Health Maintenance        Date Due Completion Dates    IMM HEP B VACCINE (1 of 3 - Primary Series) 1995 ---    IMM HEP A VACCINE (1 of 2 - Standard Series) 1996 ---    IMM HPV VACCINE (1 of 3 - Female 3 Dose Series) 2006 ---    IMM VARICELLA (CHICKENPOX) VACCINE (1 of 2 - 2 Dose Adolescent Series) 2008 ---    IMM MENINGOCOCCAL VACCINE (MCV4) (1 of 1) 2011 ---    IMM DTaP/Tdap/Td Vaccine (1 - Tdap) 2014 ---    PAP SMEAR 2016 ---            Current Immunizations     No immunizations on file.      Below and/or attached are the medications your provider expects you to take. Review all of your home medications and newly ordered medications with your provider and/or pharmacist. Follow medication instructions as directed by your provider and/or pharmacist. Please keep your medication list with you and share with your provider. Update the information when medications are discontinued, doses are  changed, or new medications (including over-the-counter products) are added; and carry medication information at all times in the event of emergency situations     Allergies:  No Known Allergies          Medications  Valid as of: May 02, 2017 - 12:22 PM    Generic Name Brand Name Tablet Size Instructions for use    Cyclobenzaprine HCl (Tab) FLEXERIL 5 MG Take 1-2 Tabs by mouth 3 times a day as needed.        Norethindrone-Eth Estradiol (Tab) NECON 1/35 (28) 1-35 MG-MCG         Norethindrone-Eth Estradiol   Take  by mouth.        TraMADol HCl (Tab) ULTRAM 50 MG Take 1 Tab by mouth every four hours as needed.        .                 Medicines prescribed today were sent to:     [facility] [id number]  [facility], [place] - [address] AT [facility]    [address] [place] [address]    Phone: [phone] Fax: [phone]    Open 24 Hours?: No      Medication refill instructions:       If your prescription bottle indicates you have medication refills left, it is not necessary to call your provider’s office. Please contact your pharmacy and they will refill your medication.    If your prescription bottle indicates you do not have any refills left, you may request refills at any time through one of the following ways: The online VisiKard system (except Urgent Care), by calling your provider’s office, or by asking your pharmacy to contact your provider’s office with a refill request. Medication refills are processed only during regular business hours and may not be available until the next business day. Your provider may request additional information or to have a follow-up visit with you prior to refilling your medication.   *Please Note: Medication refills are assigned a new Rx number when refilled electronically. Your pharmacy may indicate that no refills were authorized even though a new prescription for the same medication is available at the pharmacy. Please request the medicine by name with the  pharmacy before contacting your provider for a refill.           BugBuster Access Code: [email or web address]  Expires: 5/25/2017  7:01 PM    BugBuster  A secure, online tool to manage your health information     [employer]’s BugBuster® is a secure, online tool that connects you to your personalized health information from the privacy of your home -- day or night - making it very easy for you to manage your healthcare. Once the activation process is completed, you can even access your medical information using the BugBuster polo, which is available for free in the Apple Polo store or Google Play store.     BugBuster provides the following levels of access (as shown below):   My Chart Features   [facility] Primary Care Doctor [facility]  Specialists [facility]  Urgent  Care Non-[facility]  Primary Care  Doctor   Email your healthcare team securely and privately 24/7 X X X    Manage appointments: schedule your next appointment; view details of past/upcoming appointments X      Request prescription refills. X      View recent personal medical records, including lab and immunizations X X X X   View health record, including health history, allergies, medications X X X X   Read reports about your outpatient visits, procedures, consult and ER notes X X X X   See your discharge summary, which is a recap of your hospital and/or ER visit that includes your diagnosis, lab results, and care plan. X X       How to register for BugBuster:  1. Go to  [email or web address].  2. Click on the Sign Up Now box, which takes you to the New Member Sign Up page. You will need to provide the following information:  a. Enter your BugBuster Access Code exactly as it appears at the top of this page. (You will not need to use this code after you’ve completed the sign-up process. If you do not sign up before the expiration date, you must request a new code.)   b. Enter your date of birth.   c. Enter your home email address.   d. Click Submit, and follow the next screen’s  instructions.  3. Create a ProPerformat ID. This will be your ProPerformat login ID and cannot be changed, so think of one that is secure and easy to remember.  4. Create a ProPerformat password. You can change your password at any time.  5. Enter your Password Reset Question and Answer. This can be used at a later time if you forget your password.   6. Enter your e-mail address. This allows you to receive e-mail notifications when new information is available in BlueRoads.  7. Click Sign Up. You can now view your health information.    For assistance activating your BlueRoads account, call [phone]

## 2017-05-02 NOTE — PROGRESS NOTES
Subjective:      Niki Painter is a 21 y.o. female who presents with Shoulder Injury      DOI: 4/24/17. Left trapezius strain unimproved. Has been using heat and ice. Pain 8/10 at worst with certain movements. Taking Tramadol and flexeril with good relief. Been back to work but has been sent home early each shift. Complains of numbness/tinlging under shoulder blade.      Shoulder Injury         ROS      Medications, Allergies, and current problem list reviewed today in Epic     Objective:     /78 mmHg  Pulse 80  Temp(Src) 37.3 °C (99.1 °F)  Resp 16  Wt 57.153 kg (126 lb)  SpO2 96%  LMP 04/18/2017     Physical Exam   Constitutional: She is oriented to person, place, and time. She appears well-developed and well-nourished. No distress.   Neurological: She is alert and oriented to person, place, and time.   Skin: Skin is warm and dry. She is not diaphoretic.   Psychiatric: She has a normal mood and affect. Her behavior is normal. Judgment and thought content normal.   Nursing note and vitals reviewed.      Tenderness over the medial scapular border of the left side. No midline tenderness. Pain extends up into the left trapezius region. Some spasm.  strength equal. Range of motion shoulder within normal limits. Range of motion neck within normal limits. Neurovascularly intact.       Assessment/Plan:     1. Strain of left trapezius muscle, subsequent encounter       No improvement.  Patient will follow up in our occupational health office at our [facility] location.  Continue previous medications  Continue previous work restrictions    Please note that this dictation was created using voice recognition software. I have made every reasonable attempt to correct obvious errors, but I expect that there are errors of grammar and possibly content that I did not discover before finalizing the note.

## 2017-05-09 ENCOUNTER — APPOINTMENT (OUTPATIENT)
Dept: RADIOLOGY | Facility: IMAGING CENTER | Age: 22
End: 2017-05-09
Attending: PREVENTIVE MEDICINE
Payer: COMMERCIAL

## 2017-05-09 ENCOUNTER — OCCUPATIONAL MEDICINE (OUTPATIENT)
Dept: OCCUPATIONAL MEDICINE | Facility: CLINIC | Age: 22
End: 2017-05-09
Payer: COMMERCIAL

## 2017-05-09 VITALS
BODY MASS INDEX: 19.93 KG/M2 | TEMPERATURE: 97.7 F | HEART RATE: 84 BPM | OXYGEN SATURATION: 94 % | DIASTOLIC BLOOD PRESSURE: 70 MMHG | SYSTOLIC BLOOD PRESSURE: 110 MMHG | WEIGHT: 124 LBS | HEIGHT: 66 IN | RESPIRATION RATE: 16 BRPM

## 2017-05-09 DIAGNOSIS — S49.92XA INJURY OF LEFT SCAPULAR REGION, INITIAL ENCOUNTER: ICD-10-CM

## 2017-05-09 DIAGNOSIS — S46.912D LEFT SHOULDER STRAIN, SUBSEQUENT ENCOUNTER: ICD-10-CM

## 2017-05-09 PROCEDURE — 73010 X-RAY EXAM OF SHOULDER BLADE: CPT | Mod: TC,LT,29 | Performed by: PHYSICIAN ASSISTANT

## 2017-05-09 PROCEDURE — 73030 X-RAY EXAM OF SHOULDER: CPT | Mod: 26,LT,29 | Performed by: PHYSICIAN ASSISTANT

## 2017-05-09 PROCEDURE — 99214 OFFICE O/P EST MOD 30 MIN: CPT | Performed by: PREVENTIVE MEDICINE

## 2017-05-09 RX ORDER — PREDNISONE 20 MG/1
20 TABLET ORAL 2 TIMES DAILY
Qty: 14 TAB | Refills: 0 | Status: SHIPPED | OUTPATIENT
Start: 2017-05-09 | End: 2017-09-21

## 2017-05-09 NOTE — PATIENT INSTRUCTIONS
Shoulder Range of Motion Exercises  Shoulder range of motion (ROM) exercises are designed to keep the shoulder moving freely. They are often recommended for people who have shoulder pain.  MOVEMENT EXERCISE  When you are able, do this exercise 5-6 days per week, or as told by your health care provider. Work toward doing 2 sets of 10 swings.  Pendulum Exercise  How To Do This Exercise Lying Down  1. Lie face-down on a bed with your abdomen close to the side of the bed.  2. Let your arm hang over the side of the bed.  3. Relax your shoulder, arm, and hand.  4. Slowly and gently swing your arm forward and back. Do not use your neck muscles to swing your arm. They should be relaxed. If you are struggling to swing your arm, have someone gently swing it for you. When you do this exercise for the first time, swing your arm at a 15 degree angle for 15 seconds, or swing your arm 10 times. As pain lessens over time, increase the angle of the swing to 30-45 degrees.  5. Repeat steps 1-4 with the other arm.  How To Do This Exercise While Standing  1. Stand next to a sturdy chair or table and hold on to it with your hand.  ¨ Bend forward at the waist.  ¨ Bend your knees slightly.  ¨ Relax your other arm and let it hang limp.  ¨ Relax the shoulder blade of the arm that is hanging and let it drop.  ¨ While keeping your shoulder relaxed, use body motion to swing your arm in small circles. The first time you do this exercise, swing your arm for about 30 seconds or 10 times. When you do it next time, swing your arm for a little longer.  ¨ Stand up tall and relax.  ¨ Repeat steps 1-7, this time changing the direction of the circles.  2. Repeat steps 1-8 with the other arm.  STRETCHING EXERCISES  Do these exercises 3-4 times per day on 5-6 days per week or as told by your health care provider. Work toward holding the stretch for 20 seconds.  Stretching Exercise 1  1. Lift your arm straight out in front of you.  2. Bend your arm 90  "degrees at the elbow (right angle) so your forearm goes across your body and looks like the letter \"L.\"  3. Use your other arm to gently pull the elbow forward and across your body.  4. Repeat steps 1-3 with the other arm.  Stretching Exercise 2  You will need a towel or rope for this exercise.  1. Bend one arm behind your back with the palm facing outward.  2. Hold a towel with your other hand.  3. Reach the arm that holds the towel above your head, and bend that arm at the elbow. Your wrist should be behind your neck.  4. Use your free hand to grab the free end of the towel.  5. With the higher hand, gently pull the towel up behind you.  6. With the lower hand, pull the towel down behind you.  7. Repeat steps 1-6 with the other arm.  STRENGTHENING EXERCISES  Do each of these exercises at four different times of day (sessions) every day or as told by your health care provider. To begin with, repeat each exercise 5 times (repetitions). Work toward doing 3 sets of 12 repetitions or as told by your health care provider.  Strengthening Exercise 1  You will need a light weight for this activity. As you grow stronger, you may use a heavier weight.  1. Standing with a weight in your hand, lift your arm straight out to the side until it is at the same height as your shoulder.  2. Bend your arm at 90 degrees so that your fingers are pointing to the ceiling.  3. Slowly raise your hand until your arm is straight up in the air.  4. Repeat steps 1-3 with the other arm.  Strengthening Exercise 2  You will need a light weight for this activity. As you grow stronger, you may use a heavier weight.  1. Standing with a weight in your hand, gradually move your straight arm in an arc, starting at your side, then out in front of you, then straight up over your head.  2. Gradually move your other arm in an arc, starting at your side, then out in front of you, then straight up over your head.  3. Repeat steps 1-2 with the other " arm.  Strengthening Exercise 3  You will need an elastic band for this activity. As you grow stronger, gradually increase the size of the bands or increase the number of bands that you use at one time.  1. While standing, hold an elastic band in one hand and raise that arm up in the air.  2. With your other hand, pull down the band until that hand is by your side.  3. Repeat steps 1-2 with the other arm.     This information is not intended to replace advice given to you by your health care provider. Make sure you discuss any questions you have with your health care provider.     Document Released: 09/15/2004 Document Revised: 05/03/2016 Document Reviewed: 12/14/2015  [employer] Interactive Patient Education ©2016 [employer]

## 2017-05-09 NOTE — PROGRESS NOTES
"Subjective:      Niki Painter is a 21 y.o. female who presents with Other      DOI 4/24/2017. 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 She complains of intermittent moderate positional pain in the \"shoulder blade\" with radiation to the axilla. She reports no neck pain or other upper extremity symptom. No numbness. No weakness. She says her scapula \"feels stuck on something\".     Other        ROS  Comprehensive medical history form reviewed. Pertinent positives and negatives included in HPI.    PFSH: reviewed in Epic    PMH:  has no past medical history of Diabetes (CMS-[facility]).  MEDS: Current outpatient prescriptions:   •  predniSONE (DELTASONE) 20 MG Tab, Take 1 Tab by mouth 2 times a day., Disp: 14 Tab, Rfl: 0  •  Norethindrone-Eth Estradiol (NORTREL 1/35, 21, PO), Take  by mouth., Disp: , Rfl:   •  cyclobenzaprine (FLEXERIL) 5 MG tablet, Take 1-2 Tabs by mouth 3 times a day as needed., Disp: 30 Tab, Rfl: 0  •  tramadol (ULTRAM) 50 MG Tab, Take 1 Tab by mouth every four hours as needed., Disp: 30 Tab, Rfl: 0  •  NECON 1/35, 28, 1-35 MG-MCG per tablet, , Disp: , Rfl:   ALLERGIES: No Known Allergies  SOCHX:  reports that she has never smoked. She does not have any smokeless tobacco history on file. She reports that she does not drink alcohol or use illicit drugs.  Work Status: Job title is shoe specialist at [employer]  FH: No pertinent hereditary disorders.        Objective:     /70 mmHg  Pulse 84  Temp(Src) 36.5 °C (97.7 °F)  Resp 16  Ht 1.676 m (5' 5.98\")  Wt 56.246 kg (124 lb)  BMI 20.02 kg/m2  SpO2 94%  LMP 04/18/2017     Physical Exam    Appearance: Well-developed, Thin. BMI 21  Mental Status: Mood and Affect normal. Pleasant. Cooperative. Appropriate.   ENT: Oropharynx clear. Moist mucous membranes. Hearing normal   Eyes: Pupils reactive. Conjunctiva normal. No scleral icterus.   Neck: Trachea Midline. No " thyromegaly. No masses.  Cardiovascular: Normal rate. Regular rhythm. Normal heart sounds.   Chest: Effort normal. Breath sounds clear.   Skin: Skin is warm and dry. No rash.   Musculoskeletal: Left shoulder exam shows no swelling, ecchymosis, or heat. Tenderness in the scapular area. No overt scapular winging. Range of motion normal.  Left shoulder x-rays including scapular views show no acute bony abnormalities       Assessment/Plan:     1. Left shoulder strain, subsequent encounter  Condition unimproved  - DX-SHOULDER 2+ LEFT; Future  - predniSONE (DELTASONE) 20 MG Tab; Take 1 Tab by mouth 2 times a day.  Dispense: 14 Tab; Refill: 0  - REFERRAL TO PHYSICAL THERAPY Reason for Therapy: Eval/Treat/Report  - Restricted work/activity  - Recheck in 7-10 days

## 2017-05-09 NOTE — Clinical Note
"[address],   [address] [place] [address]  Phone: [phone] - Fax: [phone]        Occupational Health [facility] Progress Report and Disability Certification  Date of Service: 5/9/2017   No Show:  No  Date / Time of Next Visit: 5/17/17   Claim Information   Patient Name: Niki Painter  Claim Number:     Employer:   [employer] Date of Injury: 4/24/2017     Insurer / TPA: [employer]  ID / SSN:     Occupation: SHOE SUPERVISOR  Diagnosis: The encounter diagnosis was Left shoulder strain, subsequent encounter.    Medical Information   Related to Industrial Injury?   Comments:Indeterminate-atypical mechanism of causation    Subjective Complaints:  DOI 4/24/2017. 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 She complains of intermittent moderate positional pain in the \"shoulder blade\" with radiation to the axilla. She reports no neck pain or other upper extremity symptom. No numbness. No weakness. She says her scapula \"feels stuck on something\".   Objective Findings: Appearance: Well-developed, Thin. BMI 21  Mental Status: Mood and Affect normal. Pleasant. Cooperative. Appropriate.   ENT: Oropharynx clear. Moist mucous membranes. Hearing normal   Eyes: Pupils reactive. Conjunctiva normal. No scleral icterus.   Neck: Trachea Midline. No thyromegaly. No masses.  Cardiovascular: Normal rate. Regular rhythm. Normal heart sounds.   Chest: Effort normal. Breath sounds clear.   Skin: Skin is warm and dry. No rash.   Musculoskeletal: Left shoulder exam shows no swelling, ecchymosis, or heat. Tenderness in the scapular area. No overt scapular winging. Range of motion normal.  Left shoulder x-rays including scapular views show no acute bony abnormalities   Pre-Existing Condition(s):     Assessment:   Condition Same    Status: Additional Care Required  Permanent Disability:No  "   Plan:      Diagnostics:      Comments:       Disability Information   Status: Released to Restricted Duty    From:  5/9/2017  Through: 5/18/2017 Restrictions are:     Physical Restrictions   Sitting:    Standing:    Stooping:    Bending:      Squatting:    Walking:    Climbing:    Pushing:  < or = to 2 hrs/day   Pulling:  < or = to 2 hrs/day Other:    Reaching Above Shoulder (L):   Reaching Above Shoulder (R):       Reaching Below Shoulder (L):    Reaching Below Shoulder (R):      Not to exceed Weight Limits   Carrying(hrs):   Weight Limit(lb):   Lifting(hrs):   Weight  Limit(lb):     Comments:      Repetitive Actions   Hands: i.e. Fine Manipulations from Grasping:     Feet: i.e. Operating Foot Controls:     Driving / Operate Machinery:     Physician Name: Giovany Soliman M.D. Physician Signature: GIOVANY Boss M.D. e-Signature: Dr. Venkatesh Brewer, Medical Director   Clinic Name / Location: [address],   [address] [place] [address] Clinic Phone Number: Dept: [phone]   Appointment Time: 10:00 Am Visit Start Time: 10:14 AM   Check-In Time:  9:46 Am Visit Discharge Time:  11:26 AM   Original-Treating Physician or Chiropractor    Page 2-Insurer/TPA    Page 3-Employer    Page 4-Employee

## 2017-05-09 NOTE — MR AVS SNAPSHOT
"        Niki Painter   2017 10:00 AM   Occupational Medicine   MRN: [id number]    Department:  [facility]   Dept Phone:  [phone]    Description:  Female : 1995   Provider:  Giovany Soliman M.D.           Reason for Visit     Other WC FV new2u DOI 17, (L) shoulder, same, room 25      Allergies as of 2017     No Known Allergies      You were diagnosed with     Left shoulder strain, subsequent encounter   [000081]         Vital Signs     Blood Pressure Pulse Temperature Respirations Height Weight    110/70 mmHg 84 36.5 °C (97.7 °F) 16 1.676 m (5' 5.98\") 56.246 kg (124 lb)    Body Mass Index Oxygen Saturation Last Menstrual Period Smoking Status          20.02 kg/m2 94% 2017 Never Smoker         Basic Information     Date Of Birth Sex Race Ethnicity Preferred Language    1995 Female White Non- English      Problem List              ICD-10-CM Priority Class Noted - Resolved    LUQ abdominal pain R10.12   2014 - Present    Hx of iron deficiency anemia Z86.2   2014 - Present      Health Maintenance        Date Due Completion Dates    IMM HEP B VACCINE (1 of 3 - Primary Series) 1995 ---    IMM HEP A VACCINE (1 of 2 - Standard Series) 1996 ---    IMM HPV VACCINE (1 of 3 - Female 3 Dose Series) 2006 ---    IMM VARICELLA (CHICKENPOX) VACCINE (1 of 2 - 2 Dose Adolescent Series) 2008 ---    IMM MENINGOCOCCAL VACCINE (MCV4) (1 of 1) 2011 ---    IMM DTaP/Tdap/Td Vaccine (1 - Tdap) 2014 ---    PAP SMEAR 2016 ---            Current Immunizations     No immunizations on file.      Below and/or attached are the medications your provider expects you to take. Review all of your home medications and newly ordered medications with your provider and/or pharmacist. Follow medication instructions as directed by your provider and/or pharmacist. Please keep your medication list with you and share with your provider. Update the " information when medications are discontinued, doses are changed, or new medications (including over-the-counter products) are added; and carry medication information at all times in the event of emergency situations     Allergies:  No Known Allergies          Medications  Valid as of: May 09, 2017 - 11:25 AM    Generic Name Brand Name Tablet Size Instructions for use    Cyclobenzaprine HCl (Tab) FLEXERIL 5 MG Take 1-2 Tabs by mouth 3 times a day as needed.        Norethindrone-Eth Estradiol (Tab) NECON 1/35 (28) 1-35 MG-MCG         Norethindrone-Eth Estradiol   Take  by mouth.        PredniSONE (Tab) DELTASONE 20 MG Take 1 Tab by mouth 2 times a day.        TraMADol HCl (Tab) ULTRAM 50 MG Take 1 Tab by mouth every four hours as needed.        .                 Medicines prescribed today were sent to:     [facility] [address] [place] [address], [place] - [address] AT [facility]    [address] [facility] [address]    Phone: [phone] Fax: [phone]    Open 24 Hours?: No      Medication refill instructions:       If your prescription bottle indicates you have medication refills left, it is not necessary to call your provider’s office. Please contact your pharmacy and they will refill your medication.    If your prescription bottle indicates you do not have any refills left, you may request refills at any time through one of the following ways: The online [email or web address] system (except Urgent Care), by calling your provider’s office, or by asking your pharmacy to contact your provider’s office with a refill request. Medication refills are processed only during regular business hours and may not be available until the next business day. Your provider may request additional information or to have a follow-up visit with you prior to refilling your medication.   *Please Note: Medication refills are assigned a new Rx number when refilled electronically. Your pharmacy may indicate that no refills were authorized  even though a new prescription for the same medication is available at the pharmacy. Please request the medicine by name with the pharmacy before contacting your provider for a refill.        Your To Do List     Future Labs/Procedures Complete By Expires    DX-SHOULDER 2+ LEFT  As directed 5/9/2018      Referral     A referral request has been sent to our patient care coordination department. Please allow 3-5 business days for us to process this request and contact you either by phone or mail. If you do not hear from us by the 5th business day, please call us at [phone].        Instructions    Shoulder Range of Motion Exercises  Shoulder range of motion (ROM) exercises are designed to keep the shoulder moving freely. They are often recommended for people who have shoulder pain.  MOVEMENT EXERCISE  When you are able, do this exercise 5-6 days per week, or as told by your health care provider. Work toward doing 2 sets of 10 swings.  Pendulum Exercise  How To Do This Exercise Lying Down  1. Lie face-down on a bed with your abdomen close to the side of the bed.  2. Let your arm hang over the side of the bed.  3. Relax your shoulder, arm, and hand.  4. Slowly and gently swing your arm forward and back. Do not use your neck muscles to swing your arm. They should be relaxed. If you are struggling to swing your arm, have someone gently swing it for you. When you do this exercise for the first time, swing your arm at a 15 degree angle for 15 seconds, or swing your arm 10 times. As pain lessens over time, increase the angle of the swing to 30-45 degrees.  5. Repeat steps 1-4 with the other arm.  How To Do This Exercise While Standing  1. Stand next to a sturdy chair or table and hold on to it with your hand.  ¨ Bend forward at the waist.  ¨ Bend your knees slightly.  ¨ Relax your other arm and let it hang limp.  ¨ Relax the shoulder blade of the arm that is hanging and let it drop.  ¨ While keeping your shoulder  "relaxed, use body motion to swing your arm in small circles. The first time you do this exercise, swing your arm for about 30 seconds or 10 times. When you do it next time, swing your arm for a little longer.  ¨ Stand up tall and relax.  ¨ Repeat steps 1-7, this time changing the direction of the circles.  2. Repeat steps 1-8 with the other arm.  STRETCHING EXERCISES  Do these exercises 3-4 times per day on 5-6 days per week or as told by your health care provider. Work toward holding the stretch for 20 seconds.  Stretching Exercise 1  1. Lift your arm straight out in front of you.  2. Bend your arm 90 degrees at the elbow (right angle) so your forearm goes across your body and looks like the letter \"L.\"  3. Use your other arm to gently pull the elbow forward and across your body.  4. Repeat steps 1-3 with the other arm.  Stretching Exercise 2  You will need a towel or rope for this exercise.  1. Bend one arm behind your back with the palm facing outward.  2. Hold a towel with your other hand.  3. Reach the arm that holds the towel above your head, and bend that arm at the elbow. Your wrist should be behind your neck.  4. Use your free hand to grab the free end of the towel.  5. With the higher hand, gently pull the towel up behind you.  6. With the lower hand, pull the towel down behind you.  7. Repeat steps 1-6 with the other arm.  STRENGTHENING EXERCISES  Do each of these exercises at four different times of day (sessions) every day or as told by your health care provider. To begin with, repeat each exercise 5 times (repetitions). Work toward doing 3 sets of 12 repetitions or as told by your health care provider.  Strengthening Exercise 1  You will need a light weight for this activity. As you grow stronger, you may use a heavier weight.  1. Standing with a weight in your hand, lift your arm straight out to the side until it is at the same height as your shoulder.  2. Bend your arm at 90 degrees so that your " fingers are pointing to the ceiling.  3. Slowly raise your hand until your arm is straight up in the air.  4. Repeat steps 1-3 with the other arm.  Strengthening Exercise 2  You will need a light weight for this activity. As you grow stronger, you may use a heavier weight.  1. Standing with a weight in your hand, gradually move your straight arm in an arc, starting at your side, then out in front of you, then straight up over your head.  2. Gradually move your other arm in an arc, starting at your side, then out in front of you, then straight up over your head.  3. Repeat steps 1-2 with the other arm.  Strengthening Exercise 3  You will need an elastic band for this activity. As you grow stronger, gradually increase the size of the bands or increase the number of bands that you use at one time.  1. While standing, hold an elastic band in one hand and raise that arm up in the air.  2. With your other hand, pull down the band until that hand is by your side.  3. Repeat steps 1-2 with the other arm.     This information is not intended to replace advice given to you by your health care provider. Make sure you discuss any questions you have with your health care provider.     Document Released: 09/15/2004 Document Revised: 05/03/2016 Document Reviewed: 12/14/2015  [employer] Interactive Patient Education ©2016 Elsevier Inc.            Zyncd Access Code: [email or web address]  Expires: 5/25/2017  7:01 PM    Zyncd  A secure, online tool to manage your health information     [employer] Zyncd® is a secure, online tool that connects you to your personalized health information from the privacy of your home -- day or night - making it very easy for you to manage your healthcare. Once the activation process is completed, you can even access your medical information using the Zyncd polo, which is available for free in the Apple Polo store or Google Play store.     Zyncd provides the following levels of access (as shown  below):   My Chart Features   [facility] Doctor [facility]  Urgent  Care Non-[facility]  Primary Care  Doctor   Email your healthcare team securely and privately 24/7 X X X    Manage appointments: schedule your next appointment; view details of past/upcoming appointments X      Request prescription refills. X      View recent personal medical records, including lab and immunizations X X X X   View health record, including health history, allergies, medications X X X X   Read reports about your outpatient visits, procedures, consult and ER notes X X X X   See your discharge summary, which is a recap of your hospital and/or ER visit that includes your diagnosis, lab results, and care plan. X X       How to register for Compass:  1. Go to  [email or web address].  2. Click on the Sign Up Now box, which takes you to the New Member Sign Up page. You will need to provide the following information:  a. Enter your Compass Access Code exactly as it appears at the top of this page. (You will not need to use this code after you’ve completed the sign-up process. If you do not sign up before the expiration date, you must request a new code.)   b. Enter your date of birth.   c. Enter your home email address.   d. Click Submit, and follow the next screen’s instructions.  3. Create a Compass ID. This will be your Compass login ID and cannot be changed, so think of one that is secure and easy to remember.  4. Create a Compass password. You can change your password at any time.  5. Enter your Password Reset Question and Answer. This can be used at a later time if you forget your password.   6. Enter your e-mail address. This allows you to receive e-mail notifications when new information is available in Compass.  7. Click Sign Up. You can now view your health information.    For assistance activating your Compass account, call [phone]

## 2017-05-17 ENCOUNTER — OCCUPATIONAL MEDICINE (OUTPATIENT)
Dept: OCCUPATIONAL MEDICINE | Facility: CLINIC | Age: 22
End: 2017-05-17
Payer: COMMERCIAL

## 2017-05-17 VITALS
BODY MASS INDEX: 20.66 KG/M2 | HEART RATE: 88 BPM | WEIGHT: 124 LBS | RESPIRATION RATE: 14 BRPM | HEIGHT: 65 IN | OXYGEN SATURATION: 95 % | TEMPERATURE: 98.6 F | DIASTOLIC BLOOD PRESSURE: 70 MMHG | SYSTOLIC BLOOD PRESSURE: 118 MMHG

## 2017-05-17 DIAGNOSIS — S46.912D LEFT SHOULDER STRAIN, SUBSEQUENT ENCOUNTER: ICD-10-CM

## 2017-05-17 PROCEDURE — 99212 OFFICE O/P EST SF 10 MIN: CPT | Performed by: PREVENTIVE MEDICINE

## 2017-05-17 ASSESSMENT — PAIN SCALES - GENERAL: PAINLEVEL: NO PAIN

## 2017-05-17 NOTE — MR AVS SNAPSHOT
"Niki Painter   2017 11:00 AM   Occupational Medicine   MRN: [id number]    Department:  [facility]   Dept Phone:  [phone]    Description:  Female : 1995   Provider:  Giovany Soliman M.D.           Reason for Visit     Follow-Up WC DOI 2017 - L Shoulder - Better - ROOM 25      Allergies as of 2017     No Known Allergies      You were diagnosed with     Left shoulder strain, subsequent encounter   [079272]         Vital Signs     Blood Pressure Pulse Temperature Respirations Height Weight    118/70 mmHg 88 37 °C (98.6 °F) 14 1.651 m (5' 5\") 56.246 kg (124 lb)    Body Mass Index Oxygen Saturation Last Menstrual Period Smoking Status          20.63 kg/m2 95% 2017 Never Smoker         Basic Information     Date Of Birth Sex Race Ethnicity Preferred Language    1995 Female White Non- English      Problem List              ICD-10-CM Priority Class Noted - Resolved    LUQ abdominal pain R10.12   2014 - Present    Hx of iron deficiency anemia Z86.2   2014 - Present      Health Maintenance        Date Due Completion Dates    IMM HEP B VACCINE (1 of 3 - Primary Series) 1995 ---    IMM HEP A VACCINE (1 of 2 - Standard Series) 1996 ---    IMM HPV VACCINE (1 of 3 - Female 3 Dose Series) 2006 ---    IMM VARICELLA (CHICKENPOX) VACCINE (1 of 2 - 2 Dose Adolescent Series) 2008 ---    IMM MENINGOCOCCAL VACCINE (MCV4) (1 of 1) 2011 ---    IMM DTaP/Tdap/Td Vaccine (1 - Tdap) 2014 ---    PAP SMEAR 2016 ---            Current Immunizations     No immunizations on file.      Below and/or attached are the medications your provider expects you to take. Review all of your home medications and newly ordered medications with your provider and/or pharmacist. Follow medication instructions as directed by your provider and/or pharmacist. Please keep your medication list with you and share with your provider. Update the " information when medications are discontinued, doses are changed, or new medications (including over-the-counter products) are added; and carry medication information at all times in the event of emergency situations     Allergies:  No Known Allergies          Medications  Valid as of: May 17, 2017 - 11:25 AM    Generic Name Brand Name Tablet Size Instructions for use    Cyclobenzaprine HCl (Tab) FLEXERIL 5 MG Take 1-2 Tabs by mouth 3 times a day as needed.        Norethindrone-Eth Estradiol (Tab) NECON 1/35 (28) 1-35 MG-MCG         Norethindrone-Eth Estradiol   Take  by mouth.        PredniSONE (Tab) DELTASONE 20 MG Take 1 Tab by mouth 2 times a day.        TraMADol HCl (Tab) ULTRAM 50 MG Take 1 Tab by mouth every four hours as needed.        .                 Medicines prescribed today were sent to:     [facility] [address] [place] [address], [place] - [address] AT [facility] & [facility]    [address] [facility] [address]    Phone: [phone] Fax: [phone]    Open 24 Hours?: No      Medication refill instructions:       If your prescription bottle indicates you have medication refills left, it is not necessary to call your provider’s office. Please contact your pharmacy and they will refill your medication.    If your prescription bottle indicates you do not have any refills left, you may request refills at any time through one of the following ways: The online [employer]Mobi system (except Urgent Care), by calling your provider’s office, or by asking your pharmacy to contact your provider’s office with a refill request. Medication refills are processed only during regular business hours and may not be available until the next business day. Your provider may request additional information or to have a follow-up visit with you prior to refilling your medication.   *Please Note: Medication refills are assigned a new Rx number when refilled electronically. Your pharmacy may indicate that no refills were authorized  even though a new prescription for the same medication is available at the pharmacy. Please request the medicine by name with the pharmacy before contacting your provider for a refill.           [employer] Access Code: [email or web address]  Expires: 5/25/2017  7:01 PM    [employer]  A secure, online tool to manage your health information     [employer]’s [employer]® is a secure, online tool that connects you to your personalized health information from the privacy of your home -- day or night - making it very easy for you to manage your healthcare. Once the activation process is completed, you can even access your medical information using the [employer] polo, which is available for free in the Apple Polo store or Google Play store.     B[employer] provides the following levels of access (as shown below):   My Chart Features   [facility] Doctor [facility]  Specialists [facility]  Urgent  Care Non-[facility]  Primary Care  Doctor   Email your healthcare team securely and privately 24/7 X X X    Manage appointments: schedule your next appointment; view details of past/upcoming appointments X      Request prescription refills. X      View recent personal medical records, including lab and immunizations X X X X   View health record, including health history, allergies, medications X X X X   Read reports about your outpatient visits, procedures, consult and ER notes X X X X   See your discharge summary, which is a recap of your hospital and/or ER visit that includes your diagnosis, lab results, and care plan. X X       How to register for [employer]:  1. Go to  [email or web address].  2. Click on the Sign Up Now box, which takes you to the New Member Sign Up page. You will need to provide the following information:  a. Enter your [employer] Access Code exactly as it appears at the top of this page. (You will not need to use this code after you’ve completed the sign-up process. If you do not sign up before the expiration date, you must request  a new code.)   b. Enter your date of birth.   c. Enter your home email address.   d. Click Submit, and follow the next screen’s instructions.  3. Create a [employer] ID. This will be your [employer] login ID and cannot be changed, so think of one that is secure and easy to remember.  4. Create a [employer] password. You can change your password at any time.  5. Enter your Password Reset Question and Answer. This can be used at a later time if you forget your password.   6. Enter your e-mail address. This allows you to receive e-mail notifications when new information is available in [employer].  7. Click Sign Up. You can now view your health information.    For assistance activating your [employer] account, call [phone]

## 2017-05-17 NOTE — Clinical Note
[address],   [address] [place] [address]  Phone: [phone] - Fax: [phone]        [facility] Progress Report and Disability Certification  Date of Service: 5/17/2017   No Show:  No  Date / Time of Next Visit:  MMI/Discharge    Claim Information   Patient Name: Niki Painter  Claim Number:     Employer:   [employer] Date of Injury: 4/24/2017     Insurer / TPA: [employer]  ID / SSN:     Occupation: SHOE SUPERVISOR  Diagnosis: The encounter diagnosis was Left shoulder strain, subsequent encounter.    Medical Information   Related to Industrial Injury?   Comments:Indeterminate    Subjective Complaints:  DOI 4/24/2017. 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 She reports she is improved. She has a little residual stiffness. She has been doing home exercises.   Objective Findings: Appearance: Well-developed, well-nourished.   Mental Status: Mood and Affect normal. Pleasant. Cooperative. Appropriate.    Musculoskeletal: Left shoulder shows good range of motion in all planes without any pain behavior.   Pre-Existing Condition(s):     Assessment:   Condition Improved    Status: Discharged /  MMI  Permanent Disability:No    Plan:      Diagnostics:      Comments:       Disability Information   Status: Released to Full Duty    From:  5/17/2017  Through:   Restrictions are:     Physical Restrictions   Sitting:    Standing:    Stooping:    Bending:      Squatting:    Walking:    Climbing:    Pushing:      Pulling:    Other:    Reaching Above Shoulder (L):   Reaching Above Shoulder (R):       Reaching Below Shoulder (L):    Reaching Below Shoulder (R):      Not to exceed Weight Limits   Carrying(hrs):   Weight Limit(lb):   Lifting(hrs):   Weight  Limit(lb):     Comments:      Repetitive Actions   Hands: i.e. Fine Manipulations from Grasping:     Feet:  i.e. Operating Foot Controls:     Driving / Operate Machinery:     Physician Name: Giovany Soliman M.D. Physician Signature: GIOVANY Boss M.D. e-Signature: Dr. Venkatesh Brewer, Medical Director   Clinic Name / Location: [address],   [address]  [place] [address] Clinic Phone Number: Dept: [phone]   Appointment Time: 11:00 Am Visit Start Time: 11:09 AM   Check-In Time:  10:55 Am Visit Discharge Time: @11:24AM   Original-Treating Physician or Chiropractor    Page 2-Insurer/TPA    Page 3-Employer    Page 4-Employee

## 2017-05-17 NOTE — PROGRESS NOTES
"Subjective:      Niki Painter is a 21 y.o. female who presents with Follow-Up      DOI 4/24/2017. 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 She reports she is improved. She has a little residual stiffness. She has been doing home exercises.     HPI    ROS  PFSH:  WORK STATUS: Restricted activity  PMH:  has no past medical history of Diabetes (CMS-[facility]).  MEDS:   Current outpatient prescriptions:   •  predniSONE (DELTASONE) 20 MG Tab, Take 1 Tab by mouth 2 times a day., Disp: 14 Tab, Rfl: 0  •  Norethindrone-Eth Estradiol (NORTREL 1/35, 21, PO), Take  by mouth., Disp: , Rfl:   •  cyclobenzaprine (FLEXERIL) 5 MG tablet, Take 1-2 Tabs by mouth 3 times a day as needed., Disp: 30 Tab, Rfl: 0  •  tramadol (ULTRAM) 50 MG Tab, Take 1 Tab by mouth every four hours as needed., Disp: 30 Tab, Rfl: 0  •  NECON 1/35, 28, 1-35 MG-MCG per tablet, , Disp: , Rfl:        Objective:     /70 mmHg  Pulse 88  Temp(Src) 37 °C (98.6 °F)  Resp 14  Ht 1.651 m (5' 5\")  Wt 56.246 kg (124 lb)  BMI 20.63 kg/m2  SpO2 95%  LMP 04/18/2017     Physical Exam    Appearance: Well-developed, well-nourished.   Mental Status: Mood and Affect normal. Pleasant. Cooperative. Appropriate.    Musculoskeletal: Left shoulder shows good range of motion in all planes without any pain behavior.       Assessment/Plan:     1. Left shoulder strain, subsequent encounter  Condition improved  Regular work  Released from care        "

## 2017-09-21 ENCOUNTER — OFFICE VISIT (OUTPATIENT)
Dept: MEDICAL GROUP | Facility: PHYSICIAN GROUP | Age: 22
End: 2017-09-21
Payer: COMMERCIAL

## 2017-09-21 VITALS
OXYGEN SATURATION: 96 % | BODY MASS INDEX: 20.41 KG/M2 | TEMPERATURE: 98.6 F | HEART RATE: 86 BPM | RESPIRATION RATE: 16 BRPM | WEIGHT: 127 LBS | DIASTOLIC BLOOD PRESSURE: 80 MMHG | SYSTOLIC BLOOD PRESSURE: 110 MMHG | HEIGHT: 66 IN

## 2017-09-21 DIAGNOSIS — M79.672 BILATERAL FOOT PAIN: ICD-10-CM

## 2017-09-21 DIAGNOSIS — M79.671 BILATERAL FOOT PAIN: ICD-10-CM

## 2017-09-21 PROCEDURE — 99213 OFFICE O/P EST LOW 20 MIN: CPT | Performed by: NURSE PRACTITIONER

## 2017-09-21 ASSESSMENT — PATIENT HEALTH QUESTIONNAIRE - PHQ9: CLINICAL INTERPRETATION OF PHQ2 SCORE: 0

## 2017-09-22 NOTE — PROGRESS NOTES
Chief Complaint   Patient presents with   • Bunions       HISTORY OF PRESENT ILLNESS: Patient is a 22 y.o. female established patient who presents today to discuss the following issues:    Bilateral foot pain  Has been having issues with what she thinks are bunions.  She would like a referral to podiatry for evaluation.        Patient Active Problem List    Diagnosis Date Noted   • Bilateral foot pain 09/21/2017   • Hx of iron deficiency anemia 06/25/2014       Allergies:Review of patient's allergies indicates no known allergies.    Current Outpatient Prescriptions   Medication Sig Dispense Refill   • NECON 1/35, 28, 1-35 MG-MCG per tablet        No current facility-administered medications for this visit.        Social History   Substance Use Topics   • Smoking status: Never Smoker   • Smokeless tobacco: Never Used   • Alcohol use No       Family Status   Relation Status   • Mother Alive   • Father Alive   • Maternal Grandmother      Family History   Problem Relation Age of Onset   • Genetic Maternal Grandmother      MS       Review of Systems:   Constitutional: Negative for fever, chills, weight loss and malaise/fatigue.   HENT: Negative for ear pain, nosebleeds, congestion, sore throat and neck pain.    Eyes: Negative for blurred vision.   Respiratory: Negative for cough, sputum production, shortness of breath and wheezing.    Cardiovascular: Negative for chest pain, palpitations, orthopnea and leg swelling.   Gastrointestinal: Negative for heartburn, nausea, vomiting and abdominal pain.   Genitourinary: Negative for dysuria, urgency and frequency.   Musculoskeletal: Negative for myalgias, joint pain, and back pain.  Positive for bilateral foot pain.  Skin: Negative for rash and itching.   Neurological: Negative for dizziness, tingling, tremors, sensory change, focal weakness and headaches.   Endo/Heme/Allergies: Does not bruise/bleed easily.   Psychiatric/Behavioral: Negative for depression, suicidal ideas and  "memory loss.  The patient is not nervous/anxious and does not have insomnia.    All other systems reviewed and are negative except as in HPI.    Exam:  Blood pressure 110/80, pulse 86, temperature 37 °C (98.6 °F), resp. rate 16, height 1.676 m (5' 6\"), weight 57.6 kg (127 lb), last menstrual period 08/21/2017, SpO2 96 %, not currently breastfeeding.  General:  Well nourished, well developed female in NAD  Head: Grossly normal.  Neck: Supple without JVD or bruit. Thyroid is not enlarged.  Pulmonary: Clear to ausculation. Normal effort. No rales, ronchi, or wheezing.  Cardiovascular: Regular rate and rhythm without murmur.   Extremities: No clubbing, cyanosis, or edema. Small bilateral bunion formation, tender.  Skin: Intact with no obvious rashes or lesions.  Neuro: Grossly intact.  Psych: Alert and oriented x 3.  Mood and affect appropriate.    Medical decision-making and discussion: Niki is here today to discuss foot pain.  A referral was sent to podiatry, and she will follow-up here as needed.          Assessment/Plan:  1. Bilateral foot pain  REFERRAL TO PODIATRY       Return if symptoms worsen or fail to improve.    Please note that this dictation was created using voice recognition software. I have made every reasonable attempt to correct obvious errors, but I expect that there are errors of grammar and possibly content that I did not discover before finalizing the note.  "

## 2017-09-22 NOTE — ASSESSMENT & PLAN NOTE
Has been having issues with what she thinks are bunions.  She would like a referral to podiatry for evaluation.

## 2018-02-27 ENCOUNTER — OFFICE VISIT (OUTPATIENT)
Dept: URGENT CARE | Facility: PHYSICIAN GROUP | Age: 23
End: 2018-02-27
Payer: COMMERCIAL

## 2018-02-27 VITALS
BODY MASS INDEX: 20.09 KG/M2 | DIASTOLIC BLOOD PRESSURE: 62 MMHG | SYSTOLIC BLOOD PRESSURE: 98 MMHG | HEIGHT: 66 IN | WEIGHT: 125 LBS | OXYGEN SATURATION: 97 % | TEMPERATURE: 98.4 F | RESPIRATION RATE: 16 BRPM | HEART RATE: 105 BPM

## 2018-02-27 DIAGNOSIS — J98.8 RTI (RESPIRATORY TRACT INFECTION): ICD-10-CM

## 2018-02-27 PROCEDURE — 99214 OFFICE O/P EST MOD 30 MIN: CPT | Performed by: FAMILY MEDICINE

## 2018-02-27 RX ORDER — AZITHROMYCIN 250 MG/1
TABLET, FILM COATED ORAL
Qty: 6 TAB | Refills: 0 | Status: SHIPPED | OUTPATIENT
Start: 2018-02-27 | End: 2018-12-16

## 2018-02-27 RX ORDER — PREDNISONE 20 MG/1
40 TABLET ORAL EVERY MORNING
Qty: 12 TAB | Refills: 0 | Status: SHIPPED | OUTPATIENT
Start: 2018-02-27 | End: 2018-03-05

## 2018-02-27 RX ORDER — PROMETHAZINE HYDROCHLORIDE, PHENYLEPHRINE HYDROCHLORIDE AND CODEINE PHOSPHATE 6.25; 5; 1 MG/5ML; MG/5ML; MG/5ML
5 SOLUTION ORAL EVERY 8 HOURS PRN
Qty: 280 ML | Refills: 0 | Status: SHIPPED | OUTPATIENT
Start: 2018-02-27 | End: 2018-03-09

## 2018-02-27 ASSESSMENT — ENCOUNTER SYMPTOMS
FEVER: 0
DIZZINESS: 0
FOCAL WEAKNESS: 0
SHORTNESS OF BREATH: 0
CHILLS: 0
HEMOPTYSIS: 0
ORTHOPNEA: 0

## 2018-02-27 NOTE — LETTER
February 27, 2018         Patient: Niki Painter   YOB: 1995   Date of Visit: 2/27/2018           To Whom it May Concern:    Niki Painter was seen in my clinic on 2/27/2018. She may return to work tomorrow.    If you have any questions or concerns, please don't hesitate to call.        Sincerely,           Usman Anton M.D.  Electronically Signed

## 2018-02-27 NOTE — PROGRESS NOTES
"Subjective:      Niki Painter is a 22 y.o. female who presents with Cough (congestion, sore throat, ear pain x 3 days )    Chief Complaint   Patient presents with   • Cough     congestion, sore throat, ear pain x 3 days         - This is a very pleasant 22 y.o. female with complaints of cough coryza x 5 days, no NVFC          ALLERGIES:  Patient has no known allergies.     PMH:  No past medical history on file.     MEDS:    Current Outpatient Prescriptions:   •  Promethazine-Phenyleph-Codeine (PHENERGAN VC CODEINE) 6.25-5-10 MG/5ML Syrup, Take 5 mL by mouth every 8 hours as needed for up to 10 days., Disp: 280 mL, Rfl: 0  •  predniSONE (DELTASONE) 20 MG Tab, Take 2 Tabs by mouth every morning for 6 days., Disp: 12 Tab, Rfl: 0  •  azithromycin (ZITHROMAX) 250 MG Tab, Use as directed, Disp: 6 Tab, Rfl: 0  •  NECON 1/35, 28, 1-35 MG-MCG per tablet, , Disp: , Rfl:     ** I have documented what I find to be significant in regards to past medical, social, family and surgical history  in my HPI or under PMH/PSH/FH review section, otherwise it is contributory **           HPI    Review of Systems   Constitutional: Negative for chills and fever.   Respiratory: Negative for hemoptysis and shortness of breath.    Cardiovascular: Negative for chest pain and orthopnea.   Neurological: Negative for dizziness and focal weakness.          Objective:     BP (!) 98/62   Pulse (!) 105   Temp 36.9 °C (98.4 °F)   Resp 16   Ht 1.676 m (5' 6\")   Wt 56.7 kg (125 lb)   LMP 02/20/2018   SpO2 97%   BMI 20.18 kg/m²      Physical Exam   Constitutional: She appears well-developed. No distress.   HENT:   Head: Normocephalic and atraumatic.   Mouth/Throat: Oropharynx is clear and moist.   Eyes: Conjunctivae are normal.   Neck: Neck supple.   Cardiovascular: Regular rhythm.    No murmur heard.  Pulmonary/Chest: Effort normal and breath sounds normal. No respiratory distress.   Neurological: She is alert. She exhibits normal " muscle tone.   Skin: Skin is warm and dry.   Psychiatric: She has a normal mood and affect. Judgment normal.   Nursing note and vitals reviewed.              Assessment/Plan:         1. RTI (respiratory tract infection)  Promethazine-Phenyleph-Codeine (PHENERGAN VC CODEINE) 6.25-5-10 MG/5ML Syrup    predniSONE (DELTASONE) 20 MG Tab    azithromycin (ZITHROMAX) 250 MG Tab       Patient given paper Rx for abx to hold onto. Will fill in 3-4 days if symptoms getting worse.        Dx & d/c instructions discussed w/ patient and/or family members. Follow up w/ Prvt Dr or here in 3-4 days if not getting better, sooner if needed,  ER if worse and UC/PCP unavailable.        Possible side effects (i.e. Rash, GI upset/constipation, sedation, elevation of BP or sugars) of any medications given discussed.

## 2018-06-26 ENCOUNTER — OFFICE VISIT (OUTPATIENT)
Dept: URGENT CARE | Facility: PHYSICIAN GROUP | Age: 23
End: 2018-06-26
Payer: COMMERCIAL

## 2018-06-26 VITALS
TEMPERATURE: 97.8 F | OXYGEN SATURATION: 98 % | BODY MASS INDEX: 21.69 KG/M2 | WEIGHT: 135 LBS | HEIGHT: 66 IN | DIASTOLIC BLOOD PRESSURE: 60 MMHG | HEART RATE: 62 BPM | SYSTOLIC BLOOD PRESSURE: 120 MMHG | RESPIRATION RATE: 16 BRPM

## 2018-06-26 DIAGNOSIS — H10.33 ACUTE CONJUNCTIVITIS OF BOTH EYES, UNSPECIFIED ACUTE CONJUNCTIVITIS TYPE: ICD-10-CM

## 2018-06-26 PROCEDURE — 99213 OFFICE O/P EST LOW 20 MIN: CPT | Performed by: FAMILY MEDICINE

## 2018-06-26 RX ORDER — POLYMYXIN B SULFATE AND TRIMETHOPRIM 1; 10000 MG/ML; [USP'U]/ML
1 SOLUTION OPHTHALMIC EVERY 4 HOURS
Qty: 1 BOTTLE | Refills: 0 | Status: SHIPPED | OUTPATIENT
Start: 2018-06-26 | End: 2018-07-03

## 2018-06-26 ASSESSMENT — ENCOUNTER SYMPTOMS
DOUBLE VISION: 0
PHOTOPHOBIA: 1
SENSORY CHANGE: 0
MYALGIAS: 0
FOCAL WEAKNESS: 0
BLURRED VISION: 0
WEIGHT LOSS: 0
SORE THROAT: 1

## 2018-06-26 NOTE — PROGRESS NOTES
"Subjective:      Niki Painter is a 23 y.o. female who presents with Conjunctivitis (redness, swelling, discharge starting this morning)            3 days bilateral eyes red, draining, mattering in am. No viral prodrome. Possible allergic prodrome but no itching in eyes. Wears contacts but none recently. No FB/trauma. No vision loss. No other aggravating or alleviating factors.          Review of Systems   Constitutional: Negative for malaise/fatigue and weight loss.   HENT: Positive for sore throat. Negative for ear discharge and ear pain.         Rhinorrhea     Eyes: Positive for photophobia. Negative for blurred vision and double vision.   Musculoskeletal: Negative for joint pain and myalgias.   Skin: Negative for itching and rash.   Neurological: Negative for sensory change and focal weakness.          Objective:     /60   Pulse 62   Temp 36.6 °C (97.8 °F)   Resp 16   Ht 1.676 m (5' 6\")   Wt 61.2 kg (135 lb)   SpO2 98%   BMI 21.79 kg/m²      Physical Exam   Constitutional: She appears well-developed and well-nourished. No distress.   HENT:   Head: Normocephalic and atraumatic.   Right Ear: External ear normal.   Left Ear: External ear normal.   Clear rhinorrhea   Eyes: Conjunctivae are normal.   B conjunctiva injected with moderate exudate. No foreign body. No gross corneal lesion.     Cardiovascular: Normal rate, regular rhythm and normal heart sounds.    Pulmonary/Chest: Effort normal and breath sounds normal.   Neurological:   Speech is clear. Patient is appropriate and cooperative.     Skin: Skin is warm and dry. No rash noted.               Assessment/Plan:     1. Acute conjunctivitis of both eyes, unspecified acute conjunctivitis type  polymixin-trimethoprim (POLYTRIM) 00680-7.1 UNIT/ML-% Solution     Differential diagnosis, natural history, supportive care, and indications for immediate follow-up discussed at length.     ddx includes allergy, use antihistamine and consider zaditor " if no improvment

## 2018-07-11 ENCOUNTER — NON-PROVIDER VISIT (OUTPATIENT)
Dept: URGENT CARE | Facility: PHYSICIAN GROUP | Age: 23
End: 2018-07-11

## 2018-07-11 DIAGNOSIS — Z02.1 PRE-EMPLOYMENT DRUG SCREENING: ICD-10-CM

## 2018-07-11 LAB
AMP AMPHETAMINE: NORMAL
COC COCAINE: NORMAL
INT CON NEG: NEGATIVE
INT CON POS: POSITIVE
MET METHAMPHETAMINES: NORMAL
OPI OPIATES: NORMAL
PCP PHENCYCLIDINE: NORMAL
POC DRUG COMMENT 753798-OCCUPATIONAL HEALTH: NEGATIVE
THC: NORMAL

## 2018-07-11 PROCEDURE — 80305 DRUG TEST PRSMV DIR OPT OBS: CPT | Performed by: FAMILY MEDICINE

## 2018-08-23 ENCOUNTER — OFFICE VISIT (OUTPATIENT)
Dept: URGENT CARE | Facility: PHYSICIAN GROUP | Age: 23
End: 2018-08-23
Payer: COMMERCIAL

## 2018-08-23 ENCOUNTER — HOSPITAL ENCOUNTER (OUTPATIENT)
Dept: LAB | Facility: MEDICAL CENTER | Age: 23
End: 2018-08-23
Attending: FAMILY MEDICINE
Payer: COMMERCIAL

## 2018-08-23 VITALS
OXYGEN SATURATION: 96 % | TEMPERATURE: 97.9 F | SYSTOLIC BLOOD PRESSURE: 104 MMHG | RESPIRATION RATE: 12 BRPM | HEART RATE: 83 BPM | HEIGHT: 66 IN | DIASTOLIC BLOOD PRESSURE: 58 MMHG | BODY MASS INDEX: 22.66 KG/M2 | WEIGHT: 141 LBS

## 2018-08-23 DIAGNOSIS — R10.13 EPIGASTRIC ABDOMINAL PAIN: ICD-10-CM

## 2018-08-23 LAB
ALBUMIN SERPL BCP-MCNC: 3.8 G/DL (ref 3.2–4.9)
ALBUMIN/GLOB SERPL: 1.1 G/DL
ALP SERPL-CCNC: 55 U/L (ref 30–99)
ALT SERPL-CCNC: 11 U/L (ref 2–50)
ANION GAP SERPL CALC-SCNC: 5 MMOL/L (ref 0–11.9)
AST SERPL-CCNC: 13 U/L (ref 12–45)
BASOPHILS # BLD AUTO: 0.8 % (ref 0–1.8)
BASOPHILS # BLD: 0.07 K/UL (ref 0–0.12)
BILIRUB SERPL-MCNC: 0.8 MG/DL (ref 0.1–1.5)
BUN SERPL-MCNC: 12 MG/DL (ref 8–22)
CALCIUM SERPL-MCNC: 8.8 MG/DL (ref 8.5–10.5)
CHLORIDE SERPL-SCNC: 106 MMOL/L (ref 96–112)
CO2 SERPL-SCNC: 25 MMOL/L (ref 20–33)
CREAT SERPL-MCNC: 0.8 MG/DL (ref 0.5–1.4)
EOSINOPHIL # BLD AUTO: 0.06 K/UL (ref 0–0.51)
EOSINOPHIL NFR BLD: 0.7 % (ref 0–6.9)
ERYTHROCYTE [DISTWIDTH] IN BLOOD BY AUTOMATED COUNT: 44.7 FL (ref 35.9–50)
GLOBULIN SER CALC-MCNC: 3.5 G/DL (ref 1.9–3.5)
GLUCOSE SERPL-MCNC: 83 MG/DL (ref 65–99)
HCT VFR BLD AUTO: 43.4 % (ref 37–47)
HGB BLD-MCNC: 13.4 G/DL (ref 12–16)
IMM GRANULOCYTES # BLD AUTO: 0.02 K/UL (ref 0–0.11)
IMM GRANULOCYTES NFR BLD AUTO: 0.2 % (ref 0–0.9)
LIPASE SERPL-CCNC: 26 U/L (ref 11–82)
LYMPHOCYTES # BLD AUTO: 3.15 K/UL (ref 1–4.8)
LYMPHOCYTES NFR BLD: 36.5 % (ref 22–41)
MCH RBC QN AUTO: 28.3 PG (ref 27–33)
MCHC RBC AUTO-ENTMCNC: 30.9 G/DL (ref 33.6–35)
MCV RBC AUTO: 91.6 FL (ref 81.4–97.8)
MONOCYTES # BLD AUTO: 0.51 K/UL (ref 0–0.85)
MONOCYTES NFR BLD AUTO: 5.9 % (ref 0–13.4)
NEUTROPHILS # BLD AUTO: 4.83 K/UL (ref 2–7.15)
NEUTROPHILS NFR BLD: 55.9 % (ref 44–72)
NRBC # BLD AUTO: 0 K/UL
NRBC BLD-RTO: 0 /100 WBC
PLATELET # BLD AUTO: 366 K/UL (ref 164–446)
PMV BLD AUTO: 9.9 FL (ref 9–12.9)
POTASSIUM SERPL-SCNC: 4.1 MMOL/L (ref 3.6–5.5)
PROT SERPL-MCNC: 7.3 G/DL (ref 6–8.2)
RBC # BLD AUTO: 4.74 M/UL (ref 4.2–5.4)
SODIUM SERPL-SCNC: 136 MMOL/L (ref 135–145)
WBC # BLD AUTO: 8.6 K/UL (ref 4.8–10.8)

## 2018-08-23 PROCEDURE — 36415 COLL VENOUS BLD VENIPUNCTURE: CPT

## 2018-08-23 PROCEDURE — 80053 COMPREHEN METABOLIC PANEL: CPT

## 2018-08-23 PROCEDURE — 83690 ASSAY OF LIPASE: CPT

## 2018-08-23 PROCEDURE — 99214 OFFICE O/P EST MOD 30 MIN: CPT | Performed by: FAMILY MEDICINE

## 2018-08-23 PROCEDURE — 85025 COMPLETE CBC W/AUTO DIFF WBC: CPT

## 2018-08-23 RX ORDER — SUCRALFATE 1 G/1
1 TABLET ORAL
Qty: 120 TAB | Refills: 0 | Status: SHIPPED | OUTPATIENT
Start: 2018-08-23 | End: 2021-03-26

## 2018-08-23 ASSESSMENT — ENCOUNTER SYMPTOMS
EYE REDNESS: 0
FOCAL WEAKNESS: 0
WEIGHT LOSS: 0
EYE DISCHARGE: 0
SENSORY CHANGE: 0
FLANK PAIN: 0

## 2018-08-23 NOTE — PROGRESS NOTES
"Subjective:      Niki Painter is a 23 y.o. female who presents with Abdominal Pain (states ulcer and is having stomach pain x 2 weeks dizziness, weakness )            2 weeks upper abdominal pain. Intermittently changes with eating. Pain waxes and wanes. Feels same as previous gastritis/presumed ulcer. She started Pepcid with some relief. Initially had dark stool that resolved. + nausea. No emesis. No fever. Occas EtOH.         Review of Systems   Constitutional: Negative for malaise/fatigue and weight loss.   Eyes: Negative for discharge and redness.   Genitourinary: Negative for flank pain and hematuria.   Skin: Negative for itching and rash.   Neurological: Negative for sensory change and focal weakness.     .  Medications, Allergies, and current problem list reviewed today in Epic       Objective:     /58   Pulse 83   Temp 36.6 °C (97.9 °F)   Resp 12   Ht 1.676 m (5' 6\")   Wt 64 kg (141 lb)   LMP 07/26/2018   SpO2 96%   BMI 22.76 kg/m²      Physical Exam   Constitutional: She is oriented to person, place, and time. She appears well-developed and well-nourished. No distress.   HENT:   Head: Normocephalic and atraumatic.   Mouth/Throat: Oropharynx is clear and moist.   Eyes: Conjunctivae are normal.   Neck: Normal range of motion. Neck supple.   Cardiovascular: Normal rate, regular rhythm and normal heart sounds.    No murmur heard.  Pulmonary/Chest: Effort normal and breath sounds normal. No respiratory distress.   Abdominal: Soft. Bowel sounds are normal. There is tenderness ( Diffuse, greatest epigastrium.  No point tenderness at gallbladder or appendix regions. ).   Neurological: She is alert and oriented to person, place, and time.   Skin: Skin is warm and dry. No rash noted.               Assessment/Plan:     1. Epigastric abdominal pain  CBC WITH DIFFERENTIAL    COMP METABOLIC PANEL    LIPASE    sucralfate (CARAFATE) 1 GM Tab    REFERRAL TO GASTROENTEROLOGY   I will follow-up labs " today.  PPI.  Will try Carafate.  Follow-up GI.

## 2018-08-23 NOTE — LETTER
August 23, 2018         Patient: Niki Painter   YOB: 1995   Date of Visit: 8/23/2018           To Whom it May Concern:    Niki Painter was seen in my clinic on 8/23/2018. Please excuse today.       Sincerely,           Wally Ly M.D.  Electronically Signed

## 2018-12-12 ENCOUNTER — OFFICE VISIT (OUTPATIENT)
Dept: URGENT CARE | Facility: CLINIC | Age: 23
End: 2018-12-12
Payer: COMMERCIAL

## 2018-12-12 ENCOUNTER — HOSPITAL ENCOUNTER (OUTPATIENT)
Facility: MEDICAL CENTER | Age: 23
End: 2018-12-12
Attending: PHYSICIAN ASSISTANT
Payer: COMMERCIAL

## 2018-12-12 VITALS
DIASTOLIC BLOOD PRESSURE: 70 MMHG | WEIGHT: 140 LBS | HEART RATE: 82 BPM | BODY MASS INDEX: 22.5 KG/M2 | HEIGHT: 66 IN | RESPIRATION RATE: 14 BRPM | TEMPERATURE: 98.7 F | OXYGEN SATURATION: 98 % | SYSTOLIC BLOOD PRESSURE: 104 MMHG

## 2018-12-12 DIAGNOSIS — N30.00 ACUTE CYSTITIS WITHOUT HEMATURIA: Primary | ICD-10-CM

## 2018-12-12 DIAGNOSIS — N30.00 ACUTE CYSTITIS WITHOUT HEMATURIA: ICD-10-CM

## 2018-12-12 DIAGNOSIS — N89.8 VAGINAL DISCHARGE: ICD-10-CM

## 2018-12-12 DIAGNOSIS — R30.0 DYSURIA: ICD-10-CM

## 2018-12-12 DIAGNOSIS — Z20.2 POSSIBLE EXPOSURE TO STD: ICD-10-CM

## 2018-12-12 LAB
APPEARANCE UR: NORMAL
BILIRUB UR STRIP-MCNC: NEGATIVE MG/DL
COLOR UR AUTO: NORMAL
GLUCOSE UR STRIP.AUTO-MCNC: NEGATIVE MG/DL
INT CON NEG: NORMAL
INT CON POS: NORMAL
KETONES UR STRIP.AUTO-MCNC: NEGATIVE MG/DL
LEUKOCYTE ESTERASE UR QL STRIP.AUTO: NORMAL
NITRITE UR QL STRIP.AUTO: NORMAL
PH UR STRIP.AUTO: 6 [PH] (ref 5–8)
POC URINE PREGNANCY TEST: NORMAL
PROT UR QL STRIP: NEGATIVE MG/DL
RBC UR QL AUTO: NEGATIVE
SP GR UR STRIP.AUTO: >1.03
UROBILINOGEN UR STRIP-MCNC: NORMAL MG/DL

## 2018-12-12 PROCEDURE — 81025 URINE PREGNANCY TEST: CPT | Performed by: PHYSICIAN ASSISTANT

## 2018-12-12 PROCEDURE — 99214 OFFICE O/P EST MOD 30 MIN: CPT | Performed by: PHYSICIAN ASSISTANT

## 2018-12-12 PROCEDURE — 81002 URINALYSIS NONAUTO W/O SCOPE: CPT | Performed by: PHYSICIAN ASSISTANT

## 2018-12-12 PROCEDURE — 87491 CHLMYD TRACH DNA AMP PROBE: CPT

## 2018-12-12 PROCEDURE — 87591 N.GONORRHOEAE DNA AMP PROB: CPT

## 2018-12-12 PROCEDURE — 87480 CANDIDA DNA DIR PROBE: CPT

## 2018-12-12 PROCEDURE — 87510 GARDNER VAG DNA DIR PROBE: CPT

## 2018-12-12 PROCEDURE — 87660 TRICHOMONAS VAGIN DIR PROBE: CPT

## 2018-12-12 RX ORDER — SULFAMETHOXAZOLE AND TRIMETHOPRIM 800; 160 MG/1; MG/1
1 TABLET ORAL EVERY 12 HOURS
Qty: 14 TAB | Refills: 0 | Status: SHIPPED | OUTPATIENT
Start: 2018-12-12 | End: 2018-12-16

## 2018-12-12 NOTE — PROGRESS NOTES
Subjective:      Niki Painter is a 23 y.o. female who presents with Vaginal Discharge (x abd pain / dischare / ordor)    PMH:  has no past medical history of Diabetes (HCC).  MEDS:   Current Outpatient Prescriptions:   •  NECON 1/35, 28, 1-35 MG-MCG per tablet, , Disp: , Rfl:   •  Famotidine-Ca Carb-Mag Hydrox (PEPCID COMPLETE) -165 MG Chew Tab, Take  by mouth., Disp: , Rfl:   •  sucralfate (CARAFATE) 1 GM Tab, Take 1 Tab by mouth 4 Times a Day,Before Meals and at Bedtime. (Patient not taking: Reported on 12/12/2018), Disp: 120 Tab, Rfl: 0  •  azithromycin (ZITHROMAX) 250 MG Tab, Use as directed (Patient not taking: Reported on 12/12/2018), Disp: 6 Tab, Rfl: 0  ALLERGIES: No Known Allergies  SURGHX: No past surgical history on file.  SOCHX:  reports that she has never smoked. She has never used smokeless tobacco. She reports that she does not drink alcohol or use drugs.  FH: Reviewed with patient, not pertinent to this visit.           PT presents with co dysuria, frequency and bladder spasms with increase in vaginal discharge.  Pt has new sex partner and is concerned that she may have been exposed to an STI.  Denies fever, chills, rash, lesions or blisters.           Dysuria    This is a new problem. The current episode started in the past 7 days. The problem occurs every urination. The problem has been gradually worsening. The quality of the pain is described as burning. There has been no fever. She is sexually active. There is no history of pyelonephritis. Associated symptoms include a discharge, frequency and urgency. Pertinent negatives include no chills, flank pain, hematuria, nausea or possible pregnancy. She has tried nothing for the symptoms. The treatment provided no relief.       Review of Systems   Constitutional: Negative for chills.   Gastrointestinal: Negative for nausea.   Genitourinary: Positive for dysuria, frequency and urgency. Negative for flank pain and hematuria.   Skin:  "Negative for itching and rash.   All other systems reviewed and are negative.         Objective:     /70 (BP Location: Left arm, Patient Position: Sitting, BP Cuff Size: Large adult)   Pulse 82   Temp 37.1 °C (98.7 °F) (Temporal)   Resp 14   Ht 1.676 m (5' 6\")   Wt 63.5 kg (140 lb)   SpO2 98%   BMI 22.60 kg/m²      Physical Exam   Constitutional: She is oriented to person, place, and time. She appears well-developed and well-nourished. No distress.   HENT:   Head: Normocephalic and atraumatic.   Nose: Nose normal.   Mouth/Throat: Oropharynx is clear and moist.   Eyes: Pupils are equal, round, and reactive to light. Conjunctivae and EOM are normal.   Neck: Normal range of motion. Neck supple.   Cardiovascular: Normal rate, regular rhythm and normal heart sounds.    Pulmonary/Chest: Effort normal and breath sounds normal.   Abdominal: Soft. Bowel sounds are normal. There is no rebound and no guarding.   Genitourinary:   Genitourinary Comments: Pt declined pelvic exam, prefers to self swab   Musculoskeletal: Normal range of motion.   Neurological: She is alert and oriented to person, place, and time. Gait normal.   Skin: Skin is warm and dry. Capillary refill takes less than 2 seconds.   Psychiatric: She has a normal mood and affect.   Nursing note and vitals reviewed.         Preg: neg  UA: + nitrites, + LE      Assessment/Plan:     1. Acute cystitis without hematuria  POCT Urinalysis    POCT Pregnancy    CHLAMYDIA/GC PCR URINE OR SWAB    VAGINAL PATHOGENS DNA PANEL        CANCELED: VAGINAL PATHOGENS DNA PANEL   2. Possible exposure to STD  POCT Urinalysis    POCT Pregnancy    CHLAMYDIA/GC PCR URINE OR SWAB    VAGINAL PATHOGENS DNA PANEL           3. Vaginal discharge  POCT Urinalysis    POCT Pregnancy    CHLAMYDIA/GC PCR URINE OR SWAB    VAGINAL PATHOGENS DNA PANEL           4. Dysuria  POCT Urinalysis    POCT Pregnancy    CHLAMYDIA/GC PCR URINE OR SWAB    VAGINAL PATHOGENS DNA PANEL             Will " treat UTI with Bactrim.    Will wait for lab results to treat any thing else.  PT to refrain from any sexual contact until results are known. PT verbalized understanding and agreement of this.    PT should follow up with PCP in 1-2 days for re-evaluation if symptoms have not improved.  Discussed red flags and reasons to return to UC or ED.  Pt and/or family verbalized understanding of diagnosis and follow up instructions and was offered informational handout on diagnosis.  PT discharged.

## 2018-12-13 LAB
CANDIDA DNA VAG QL PROBE+SIG AMP: NEGATIVE
G VAGINALIS DNA VAG QL PROBE+SIG AMP: POSITIVE
T VAGINALIS DNA VAG QL PROBE+SIG AMP: NEGATIVE

## 2018-12-14 LAB
C TRACH DNA SPEC QL NAA+PROBE: NEGATIVE
N GONORRHOEA DNA SPEC QL NAA+PROBE: NEGATIVE
SPECIMEN SOURCE: NORMAL

## 2018-12-16 ENCOUNTER — TELEPHONE (OUTPATIENT)
Dept: URGENT CARE | Facility: CLINIC | Age: 23
End: 2018-12-16

## 2018-12-16 DIAGNOSIS — N76.0 BACTERIAL VAGINOSIS: ICD-10-CM

## 2018-12-16 DIAGNOSIS — B96.89 BACTERIAL VAGINOSIS: ICD-10-CM

## 2018-12-16 RX ORDER — METRONIDAZOLE 500 MG/1
500 TABLET ORAL 2 TIMES DAILY
Qty: 14 TAB | Refills: 0 | Status: SHIPPED | OUTPATIENT
Start: 2018-12-16 | End: 2018-12-23

## 2018-12-16 NOTE — TELEPHONE ENCOUNTER
I called to give pt her results, her vm did not identify that it was Niki, so I left a message to return the call. I did not leave any results on this vm.      I did instruct her to return my call to  at [facility].      I also instructed her to check her mychart as I have given her her results in mychart as well as Rx for treatment of BV to her pharmacy.

## 2018-12-17 ASSESSMENT — ENCOUNTER SYMPTOMS
NAUSEA: 0
CHILLS: 0
FLANK PAIN: 0

## 2019-01-24 ENCOUNTER — OFFICE VISIT (OUTPATIENT)
Dept: URGENT CARE | Facility: PHYSICIAN GROUP | Age: 24
End: 2019-01-24
Payer: COMMERCIAL

## 2019-01-24 ENCOUNTER — HOSPITAL ENCOUNTER (OUTPATIENT)
Facility: MEDICAL CENTER | Age: 24
End: 2019-01-24
Attending: FAMILY MEDICINE
Payer: COMMERCIAL

## 2019-01-24 VITALS
WEIGHT: 140 LBS | OXYGEN SATURATION: 96 % | HEIGHT: 66 IN | RESPIRATION RATE: 16 BRPM | BODY MASS INDEX: 22.5 KG/M2 | SYSTOLIC BLOOD PRESSURE: 112 MMHG | TEMPERATURE: 99.2 F | DIASTOLIC BLOOD PRESSURE: 62 MMHG | HEART RATE: 100 BPM

## 2019-01-24 DIAGNOSIS — A64 STD (SEXUALLY TRANSMITTED DISEASE): ICD-10-CM

## 2019-01-24 PROCEDURE — 87591 N.GONORRHOEAE DNA AMP PROB: CPT

## 2019-01-24 PROCEDURE — 87491 CHLMYD TRACH DNA AMP PROBE: CPT

## 2019-01-24 PROCEDURE — 99214 OFFICE O/P EST MOD 30 MIN: CPT | Performed by: FAMILY MEDICINE

## 2019-01-24 RX ORDER — AZITHROMYCIN 500 MG/1
1000 TABLET, FILM COATED ORAL ONCE
Qty: 2 TAB | Refills: 0 | Status: SHIPPED | OUTPATIENT
Start: 2019-01-24 | End: 2019-01-24

## 2019-01-24 ASSESSMENT — ENCOUNTER SYMPTOMS
FEVER: 0
DIZZINESS: 0
CHILLS: 0
ORTHOPNEA: 0
SHORTNESS OF BREATH: 0
HEMOPTYSIS: 0
FOCAL WEAKNESS: 0

## 2019-01-24 NOTE — PROGRESS NOTES
"Subjective:      Niki Painter is a 23 y.o. female who presents with Exposure to STD (no sx )    - This is a very pleasant, well and non-toxic appearing 23 y.o. female with complaints of ex BF notified her in past cpl days he is having penile DC and dysuria and has an STD. She is not sure which one. She is currently asymptomatic           ALLERGIES:  Patient has no known allergies.     PMH:  History reviewed. No pertinent past medical history.     PSH:  History reviewed. No pertinent surgical history.    MEDS:    Current Outpatient Prescriptions:   •  azithromycin (ZITHROMAX) 500 MG tablet, Take 2 Tabs by mouth Once for 1 dose., Disp: 2 Tab, Rfl: 0  •  NECON 1/35, 28, 1-35 MG-MCG per tablet, , Disp: , Rfl:   •  Famotidine-Ca Carb-Mag Hydrox (PEPCID COMPLETE) -165 MG Chew Tab, Take  by mouth., Disp: , Rfl:   •  sucralfate (CARAFATE) 1 GM Tab, Take 1 Tab by mouth 4 Times a Day,Before Meals and at Bedtime. (Patient not taking: Reported on 12/12/2018), Disp: 120 Tab, Rfl: 0    Current Facility-Administered Medications:   •  cefTRIAXone (ROCEPHIN) 500 mg, lidocaine (XYLOCAINE) 1 % 1.8 mL for IM use, 500 mg, Intramuscular, Once, Usman Anton M.D.    ** I have documented what I find to be significant in regards to past medical, social, family and surgical history  in my HPI or under PMH/PSH/FH review section, otherwise it is contributory **             HPI    Review of Systems   Constitutional: Negative for chills and fever.   Respiratory: Negative for hemoptysis and shortness of breath.    Cardiovascular: Negative for chest pain and orthopnea.   Neurological: Negative for dizziness and focal weakness.          Objective:     /62   Pulse 100   Temp 37.3 °C (99.2 °F) (Temporal)   Resp 16   Ht 1.676 m (5' 6\")   Wt 63.5 kg (140 lb)   SpO2 96%   BMI 22.60 kg/m²      Physical Exam   Constitutional: She appears well-developed. No distress.   HENT:   Head: Normocephalic and atraumatic. "   Mouth/Throat: Oropharynx is clear and moist.   Eyes: Conjunctivae are normal.   Neck: Neck supple.   Cardiovascular: Regular rhythm.    No murmur heard.  Pulmonary/Chest: Effort normal. No respiratory distress.   Neurological: She is alert. She exhibits normal muscle tone.   Skin: Skin is warm and dry.   Psychiatric: She has a normal mood and affect. Judgment normal.   Nursing note and vitals reviewed.              Assessment/Plan:         1. STD (sexually transmitted disease)  cefTRIAXone (ROCEPHIN) 500 mg, lidocaine (XYLOCAINE) 1 % 1.8 mL for IM use    azithromycin (ZITHROMAX) 500 MG tablet    CHLAMYDIA/GC PCR URINE OR SWAB             Dx & d/c instructions discussed w/ patient and/or family members.     ER precautions (worsening signs symptoms and when to go to ER) discussed.    Follow up w/ PCP in 2-3 days to make sure symptoms improving and no further intervention/treatment and/or work-up needed was advised, ER if feeling worse or not improving in 2 days.    Possible side effects (i.e. Rash, GI upset/constipation, sedation, elevation of BP or sugars) of any medications given discussed.     Patient left in stable condition

## 2019-01-25 LAB
C TRACH DNA SPEC QL NAA+PROBE: POSITIVE
N GONORRHOEA DNA SPEC QL NAA+PROBE: NEGATIVE
SPECIMEN SOURCE: ABNORMAL

## 2019-01-27 ENCOUNTER — TELEPHONE (OUTPATIENT)
Dept: URGENT CARE | Facility: PHYSICIAN GROUP | Age: 24
End: 2019-01-27

## 2019-07-03 ENCOUNTER — OCCUPATIONAL MEDICINE (OUTPATIENT)
Dept: URGENT CARE | Facility: PHYSICIAN GROUP | Age: 24
End: 2019-07-03
Payer: COMMERCIAL

## 2019-07-03 VITALS
RESPIRATION RATE: 16 BRPM | OXYGEN SATURATION: 97 % | TEMPERATURE: 97.6 F | HEART RATE: 82 BPM | WEIGHT: 150 LBS | HEIGHT: 66 IN | DIASTOLIC BLOOD PRESSURE: 70 MMHG | SYSTOLIC BLOOD PRESSURE: 118 MMHG | BODY MASS INDEX: 24.11 KG/M2

## 2019-07-03 DIAGNOSIS — S46.912A STRAIN OF LEFT SHOULDER, INITIAL ENCOUNTER: ICD-10-CM

## 2019-07-03 DIAGNOSIS — S16.1XXA CERVICAL MYOFASCIAL STRAIN, INITIAL ENCOUNTER: ICD-10-CM

## 2019-07-03 PROCEDURE — 99213 OFFICE O/P EST LOW 20 MIN: CPT | Mod: 29 | Performed by: PHYSICIAN ASSISTANT

## 2019-07-03 NOTE — LETTER
[facility]  [address] - [place] [address]  Phone:  [phone] - Fax:  [phone]   [facility] Progress Report and Disability Certification  Date of Service: 7/3/2019   No Show:  No  Date / Time of Next Visit: 7/8/2019   Claim Information   Patient Name: Niki Painter  Claim Number:     Employer:   [employer] Date of Injury: 7/2/2019     Insurer / TPA:    ID / SSN:     Occupation: wear house  Diagnosis: Diagnoses of Strain of left shoulder, initial encounter and Cervical myofascial strain, initial encounter were pertinent to this visit.    Medical Information   Related to Industrial Injury? Yes    Subjective Complaints:  DOI: yesterday, 7/2/2019  Pt notes yesterday w/ injury to neck, was looking down while standing up and felt spasm pain to left back near shoulder blade. Denies numbness or tingling. C/o spasm pain. Denies saddle anesthesia or incontinence.  Denies trauma.  Denies PMH of surgery or injury to neck or shoulder. Denies other employment currently. Has tried OTC nsaids w/ mild improved s/sx.     Objective Findings: Gen: AOx3; Head: NC AT; Eyes: PERRLA/EOM; Lungs: NLR; Cardiac: RR by periph pulse exam; Left shoulder: Neck: Grossly normal no erythema ecchymosis or edema, no midline or bony tenderness to palpation near full active range of motion, some rotation lacking on right side, no erythema or rash noted; left shoulder: Full active range of motion, no effusion erythema ecchymosis, rotator cuff strength 5 out of 5, negative speeds, negative Neer's, mild tenderness to palpation on medial border of scapula on left side, no palpable spasm; neuro: N VID, interosseous strength 5 out of 5, +2 radial pulses bilaterally   Pre-Existing Condition(s):     Assessment:   Initial Visit    Status: Additional Care Required  Permanent Disability:No    Plan:   Comments:Restricted duty 25 pound weight restriction, OTC NSAIDs,  no climbing or stooping, ice and heat, sent with stretching guide, follow-up in 5 days for repeat evaluation     Diagnostics:      Comments:  Restricted duty 25 pound weight restriction, OTC NSAIDs, no climbing or stooping, ice and heat, sent with stretching guide, follow-up in 5 days for repeat evaluation    Disability Information   Status: Released to Restricted Duty    From:  7/3/2019  Through: 2019 Restrictions are: Temporary   Physical Restrictions   Sitting:    Standing:    Stoopin hrs/day Bending:      Squatting:    Walking:    Climbin hrs/day Pushing:      Pulling:    Other:    Reaching Above Shoulder (L):   Reaching Above Shoulder (R):       Reaching Below Shoulder (L):    Reaching Below Shoulder (R):      Not to exceed Weight Limits   Carrying(hrs):   Weight Limit(lb): < or = to 25 pounds Lifting(hrs):   Weight  Limit(lb): < or = to 25 pounds   Comments: Restricted duty 25 pound weight restriction, OTC NSAIDs, no climbing or stooping, ice and heat, sent with stretching guide, follow-up in 5 days for repeat evaluation    Repetitive Actions   Hands: i.e. Fine Manipulations from Grasping:     Feet: i.e. Operating Foot Controls:     Driving / Operate Machinery:     Physician Name: Sergey Starr P.A.-C. Physician Signature:   e-Signature: Dr. Alvarez López, Medical Director   Clinic Name / Location: [address]  [place] [address] Clinic Phone Number: Dept: [phone]   Appointment Time: 12:50 Pm Visit Start Time: 1:17 PM   Check-In Time:  1:12 Pm Visit Discharge Time:  2:27pm    Original-Treating Physician or Chiropractor    Page 2-Insurer/TPA    Page 3-Employer    Page 4-Employee

## 2019-07-03 NOTE — LETTER
"EMPLOYEE’S CLAIM FOR COMPENSATION/ REPORT OF INITIAL TREATMENT  FORM C-4    EMPLOYEE’S CLAIM - PROVIDE ALL INFORMATION REQUESTED   First Name  Niki Last Name  Inocencio Birthdate                    1995                Sex  female Claim Number   Home Address  [address] Age  24 y.o. 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 (if applicable)  I was looking down and kinked my neck.     How did this injury or occupational disease occur? (Be specific an answer in detail. Use additional sheet if necessary)  I over extended my nek. Its spazimming in my shoulder and chest    If you believe that you have an occupational disease, when did you first have knowledge of the disability and it relationship to your employment?  NA Witnesses to the Accident  NA      Nature of Injury or Occupational Disease  Strain  Part(s) of Body Injured or Affected  Shoulder (L), Soft Tissue - Neck,     I certify that the above is true and correct to the best of my knowledge and that I have provided this information in order to obtain the benefits of [place]’s " [place]  I hereby authorize any physician, chiropractor, surgeon, practitioner, or other person, any hospital, including [facility] or [facility], any medical service organization, any insurance company, or other institution or organization to release to each other, any medical or other information, including benefits paid or payable, pertinent to this injury or disease, except information relative to diagnosis, treatment and/or counseling for AIDS, psychological conditions, alcohol or controlled substances, for which I must give specific authorization.  A Photostat of this authorization shall be as valid as the original.     Date 7/3/19   Place [facility] [place]    Employee’s Signature   THIS REPORT MUST BE COMPLETED AND MAILED WITHIN 3 WORKING DAYS OF TREATMENT   Place  [facility]  Name of Facility  [facility]   Date  7/3/2019 Diagnosis  (S46.912A) Strain of left shoulder, initial encounter  (S16.1XXA) Cervical myofascial strain, initial encounter Is there evidence the injured employee was under the influence of alcohol and/or another controlled substance at the time of accident?   Hour  1:17 PM Description of Injury or Disease  Diagnoses of Strain of left shoulder, initial encounter and Cervical myofascial strain, initial encounter were pertinent to this visit. No   Treatment  Restricted duty 25 pound weight restriction, OTC NSAIDs, no climbing or stooping, ice and heat, sent with stretching guide, follow-up in 5 days for repeat evaluation  Have you advised the patient to remain off work five days or more? No   X-Ray Findings      If Yes   From Date  To Date      From information given by the employee, together with medical evidence, can you directly connect this injury or occupational disease as job incurred?  Yes If No Full Duty  No Modified Duty  Yes   Is additional medical care  "by a physician indicated?  Yes If Modified Duty, Specify any Limitations / Restrictions  Restricted duty 25 pound weight restriction, OTC NSAIDs, no climbing or stooping, ice and heat, sent with stretching guide, follow-up in 5 days for repeat evaluation   Do you know of any previous injury or disease contributing to this condition or occupational disease?                            No   Date  7/3/2019 Print Doctor’s Name Sergey Starr P.A.-C. I certify the employer’s copy of  this form was mailed on:   Address  [address] [place] [address]. [address] Insurer’s Use Only     [facility] Zip  [address]    Provider’s Tax ID Number  [id number] Telephone  Dept: [phone]            e-Signature: Dr. Alvarez López, Medical Director Degree  CHERYL        ORIGINAL-TREATING PHYSICIAN OR CHIROPRACTOR    PAGE 2-INSURER/TPA    PAGE 3-EMPLOYER    PAGE 4-EMPLOYEE             Form C-4 (rev10/07)              BRIEF DESCRIPTION OF RIGHTS AND BENEFITS  (Pursuant to [place]RS 616C.050)    Notice of Injury or Occupational Disease (Incident Report Form C-1): If an injury or occupational disease (OD) arises out of and in the  course of employment, you must provide written notice to your employer as soon as practicable, but no later than 7 days after the accident or  OD. Your employer shall maintain a sufficient supply of the required forms.    Claim for Compensation (Form C-4): If medical treatment is sought, the form C-4 is available at the place of initial treatment. A completed  \"Claim for Compensation\" (Form C-4) must be filed within 90 days after an accident or OD. The treating physician or chiropractor must,  within 3 working days after treatment, complete and mail to the employer, the employer's insurer and third-party , the Claim for  Compensation.    Medical Treatment: If you require medical treatment for your on-the-job injury or OD, you may be required to select a physician or  chiropractor from a list provided by " your workers’ compensation insurer, if it has contracted with an Organization for Managed Care (MCO) or  Preferred Provider Organization (PPO) or providers of health care. If your employer has not entered into a contract with an MCO or PPO, you  may select a physician or chiropractor from the Panel of Physicians and Chiropractors. Any medical costs related to your industrial injury or  OD will be paid by your insurer.    Temporary Total Disability (TTD): If your doctor has certified that you are unable to work for a period of at least 5 consecutive days, or 5  cumulative days in a 20-day period, or places restrictions on you that your employer does not accommodate, you may be entitled to TTD  compensation.    Temporary Partial Disability (TPD): If the wage you receive upon reemployment is less than the compensation for TTD to which you are  entitled, the insurer may be required to pay you TPD compensation to make up the difference. TPD can only be paid for a maximum of 24  months.    Permanent Partial Disability (PPD): When your medical condition is stable and there is an indication of a PPD as a result of your injury or  OD, within 30 days, your insurer must arrange for an evaluation by a rating physician or chiropractor to determine the degree of your PPD. The  amount of your PPD award depends on the date of injury, the results of the PPD evaluation and your age and wage.    Permanent Total Disability (PTD): If you are medically certified by a treating physician or chiropractor as permanently and totally disabled  and have been granted a PTD status by your insurer, you are entitled to receive monthly benefits not to exceed 66 2/3% of your average  monthly wage. The amount of your PTD payments is subject to reduction if you previously received a PPD award.    Vocational Rehabilitation Services: You may be eligible for vocational rehabilitation services if you are unable to return to the job due to a  permanent  physical impairment or permanent restrictions as a result of your injury or occupational disease.    Transportation and Per Ramon Reimbursement: You may be eligible for travel expenses and per ramon associated with medical treatment.    Reopening: You may be able to reopen your claim if your condition worsens after claim closure.    Appeal Process: If you disagree with a written determination issued by the insurer or the insurer does not respond to your request, you may  appeal to the Department of Administration, , by following the instructions contained in your determination letter. You must  appeal the determination within 70 days from the date of the determination letter at [address], [address], [place], [place]  [address], or [address], [address], [place] [address]. If you disagree with the  decision, you may appeal to the  Department of Administration, . You must file your appeal within 30 days from the date of the  decision  letter at [address], [address], [place], [place] [address], or [address] [facility] [address], [place] [address]. If you  disagree with a decision of an , you may file a petition for judicial review with the District Court. You must do so within 30  days of the Appeal Officer’s decision. You may be represented by an  at your own expense or you may contact the [facility] for possible  representation.    [place]  for Injured Workers (NAIW): If you disagree with a  decision, you may request that NAIW represent you  without charge at an  Hearing. For information regarding denial of benefits, you may contact the [facility] at: [address], [address], [place] [address], [phone], or [address], [place] [address], [phone]    To File a Complaint with the Division: If you wish to file a  complaint with the  of the Division of Industrial Relations (DIR),  please contact the Workers’ Compensation Section, [address], [address], [place], [place] [address], telephone [phone], or  [address], [address], [place], [place] [address], telephone [phone].    For assistance with Workers’ Compensation Issues: you may contact the Office of the Governor Consumer Health Assistance, [address] [place] [address], [address], [place], [place] [address], Toll Free 1-366.355.1286, Web site: [email or web address] access [email or web address]., E-mail  [email or web address]                                                                                                                                                                                                                                   __________________________________________________________________                                                                   _________________                Employee Name / Signature                                                                                                                                                       Date                                                                                                                                                                                                     D-2 (rev. 10/07)

## 2019-07-03 NOTE — PROGRESS NOTES
"Subjective:      Niki Painter is a 24 y.o. female who presents with Shoulder Injury (felt a pop in neck, feels a spasm when moving arm; WC )      DOI: yesterday, 7/2/2019  Pt notes yesterday w/ injury to neck, was looking down while standing up and felt spasm pain to left back near shoulder blade. Denies numbness or tingling. C/o spasm pain. Denies saddle anesthesia or incontinence.  Denies trauma.  Denies PMH of surgery or injury to neck or shoulder. Denies other employment currently. Has tried OTC nsaids w/ mild improved s/sx.       Shoulder Injury          ROS       Objective:     /70   Pulse 82   Temp 36.4 °C (97.6 °F)   Resp 16   Ht 1.676 m (5' 6\")   Wt 68 kg (150 lb)   SpO2 97%   BMI 24.21 kg/m²      Physical Exam    Gen: AOx3; Head: NC AT; Eyes: PERRLA/EOM; Lungs: NLR; Cardiac: RR by periph pulse exam; Left shoulder: Neck: Grossly normal no erythema ecchymosis or edema, no midline or bony tenderness to palpation near full active range of motion, some rotation lacking on right side, no erythema or rash noted; left shoulder: Full active range of motion, no effusion erythema ecchymosis, rotator cuff strength 5 out of 5, negative speeds, negative Neer's, mild tenderness to palpation on medial border of scapula on left side, no palpable spasm; neuro: N VID, interosseous strength 5 out of 5, +2 radial pulses bilaterally        Assessment/Plan:     1. Strain of left shoulder, initial encounter  Restricted duty 25 pound weight restriction, OTC NSAIDs, no climbing or stooping, ice and heat, sent with stretching guide, follow-up in 5 days for repeat evaluation    2. Cervical myofascial strain, initial encounter        "

## 2019-07-08 ENCOUNTER — OCCUPATIONAL MEDICINE (OUTPATIENT)
Dept: URGENT CARE | Facility: PHYSICIAN GROUP | Age: 24
End: 2019-07-08
Payer: COMMERCIAL

## 2019-07-08 VITALS
BODY MASS INDEX: 24.11 KG/M2 | DIASTOLIC BLOOD PRESSURE: 70 MMHG | SYSTOLIC BLOOD PRESSURE: 114 MMHG | OXYGEN SATURATION: 95 % | WEIGHT: 150 LBS | HEIGHT: 66 IN | TEMPERATURE: 98.6 F | HEART RATE: 74 BPM | RESPIRATION RATE: 16 BRPM

## 2019-07-08 DIAGNOSIS — S16.1XXD CERVICAL MYOFASCIAL STRAIN, SUBSEQUENT ENCOUNTER: ICD-10-CM

## 2019-07-08 DIAGNOSIS — S46.912D STRAIN OF LEFT SHOULDER, SUBSEQUENT ENCOUNTER: ICD-10-CM

## 2019-07-08 PROCEDURE — 99213 OFFICE O/P EST LOW 20 MIN: CPT | Mod: 29 | Performed by: NURSE PRACTITIONER

## 2019-07-08 NOTE — PATIENT INSTRUCTIONS
Shoulder Pain  Many things can cause shoulder pain, including:  · An injury to the area.  · Overuse of the shoulder.  · Arthritis.  The source of the pain can be:  · Inflammation.  · An injury to the shoulder joint.  · An injury to a tendon, ligament, or bone.  Follow these instructions at home:  Take these actions to help with your pain:  · Squeeze a soft ball or a foam pad as much as possible. This helps to keep the shoulder from swelling. It also helps to strengthen the arm.  · Take over-the-counter and prescription medicines only as told by your health care provider.  · If directed, apply ice to the area:  ¨ Put ice in a plastic bag.  ¨ Place a towel between your skin and the bag.  ¨ Leave the ice on for 20 minutes, 2-3 times per day. Stop applying ice if it does not help with the pain.  · If you were given a shoulder sling or immobilizer:  ¨ Wear it as told.  ¨ Remove it to shower or bathe.  ¨ Move your arm as little as possible, but keep your hand moving to prevent swelling.  Contact a health care provider if:  · Your pain gets worse.  · Your pain is not relieved with medicines.  · New pain develops in your arm, hand, or fingers.  Get help right away if:  · Your arm, hand, or fingers:  ¨ Tingle.  ¨ Become numb.  ¨ Become swollen.  ¨ Become painful.  ¨ Turn white or blue.  This information is not intended to replace advice given to you by your health care provider. Make sure you discuss any questions you have with your health care provider.  Document Released: 09/27/2006 Document Revised: 08/13/2017 Document Reviewed: 04/11/2016  [employer]vier Interactive Patient Education © 2017 [employer]    Neck Exercises  Neck exercises can be important for many reasons:  · They can help you to improve and maintain flexibility in your neck. This can be especially important as you age.  · They can help to make your neck stronger. This can make movement easier.  · They can reduce or prevent neck pain.  · They may help your  upper back.  Ask your health care provider which neck exercises would be best for you.  Exercises  Neck Press   Repeat this exercise 10 times. Do it first thing in the morning and right before bed or as told by your health care provider.  1. Lie on your back on a firm bed or on the floor with a pillow under your head.  2. Use your neck muscles to push your head down on the pillow and straighten your spine.  3. Hold the position as well as you can. Keep your head facing up and your chin tucked.  4. Slowly count to 5 while holding this position.  5. Relax for a few seconds. Then repeat.  Isometric Strengthening   Do a full set of these exercises 2 times a day or as told by your health care provider.  1. Sit in a supportive chair and place your hand on your forehead.  2. Push forward with your head and neck while pushing back with your hand. Hold for 10 seconds.  3. Relax. Then repeat the exercise 3 times.  4. Next, do the sequence again, this time putting your hand against the back of your head. Use your head and neck to push backward against the hand pressure.  5. Finally, do the same exercise on either side of your head, pushing sideways against the pressure of your hand.  Prone Head Lifts   Repeat this exercise 5 times. Do this 2 times a day or as told by your health care provider.  1. Lie face-down, resting on your elbows so that your chest and upper back are raised.  2. Start with your head facing downward, near your chest. Position your chin either on or near your chest.  3. Slowly lift your head upward. Lift until you are looking straight ahead. Then continue lifting your head as far back as you can stretch.  4. Hold your head up for 5 seconds. Then slowly lower it to your starting position.  Supine Head Lifts   Repeat this exercise 8-10 times. Do this 2 times a day or as told by your health care provider.  1. Lie on your back, bending your knees to point to the ceiling and keeping your feet flat on the  floor.  2. Lift your head slowly off the floor, raising your chin toward your chest.  3. Hold for 5 seconds.  4. Relax and repeat.  Scapular Retraction   Repeat this exercise 5 times. Do this 2 times a day or as told by your health care provider.  1. Stand with your arms at your sides. Look straight ahead.  2. Slowly pull both shoulders backward and downward until you feel a stretch between your shoulder blades in your upper back.  3. Hold for 10-30 seconds.  4. Relax and repeat.  Contact a health care provider if:  · Your neck pain or discomfort gets much worse when you do an exercise.  · Your neck pain or discomfort does not improve within 2 hours after you exercise.  If you have any of these problems, stop exercising right away. Do not do the exercises again unless your health care provider says that you can.  Get help right away if:  · You develop sudden, severe neck pain. If this happens, stop exercising right away. Do not do the exercises again unless your health care provider says that you can.  Exercises  Neck Stretch   Repeat this exercise 3-5 times.  1. Do this exercise while standing or while sitting in a chair.  2. Place your feet flat on the floor, shoulder-width apart.  3. Slowly turn your head to the right. Turn it all the way to the right so you can look over your right shoulder. Do not tilt or tip your head.  4. Hold this position for 10-30 seconds.  5. Slowly turn your head to the left, to look over your left shoulder.  6. Hold this position for 10-30 seconds.  Neck Retraction   Repeat this exercise 8-10 times. Do this 3-4 times a day or as told by your health care provider.  1. Do this exercise while standing or while sitting in a sturdy chair.  2. Look straight ahead. Do not bend your neck.  3. Use your fingers to push your chin backward. Do not bend your neck for this movement. Continue to face straight ahead. If you are doing the exercise properly, you will feel a slight sensation in your  throat and a stretch at the back of your neck.  4. Hold the stretch for 1-2 seconds. Relax and repeat.  This information is not intended to replace advice given to you by your health care provider. Make sure you discuss any questions you have with your health care provider.  Document Released: 11/28/2016 Document Revised: 05/25/2017 Document Reviewed: 06/27/2016  [employer] Interactive Patient Education © 2017 [employer]

## 2019-07-08 NOTE — LETTER
[facility]  [address] - [place] [address]  Phone:  [phone] - Fax:  [phone]   [facility] Progress Report and Disability Certification  Date of Service: 7/8/2019   No Show:  No  Date / Time of Next Visit: 7/13/2019   Claim Information   Patient Name: Niki Painter  Claim Number:     Employer:   [employer] Date of Injury: 7/2/2019     Insurer / TPA:    ID / SSN:     Occupation: wear house  Diagnosis: Diagnoses of Strain of left shoulder, subsequent encounter and Cervical myofascial strain, subsequent encounter were pertinent to this visit.    Medical Information   Related to Industrial Injury? Yes    Subjective Complaints:  DOI: 7/2/2019: Reported injury to neck while at work. Was looking down while standing up and felt spasm pain to left back near shoulder blade. Denies other employment currently.     Follow up today: Symptoms are improving. Stiffness and strain sensation in left shoulder that radiates down under arm and back of shoulder persists. Has increase ROM in neck. Doing exercises as directed. Not currently taking medications. Pain 5/10, pain increased with laying on area, especially while sleeping. No numbness or tingling.        Objective Findings: Pulses: Radial pulses are 2+ on the left side.   Left shoulder: She exhibits tenderness. She exhibits no bony tenderness, no swelling, no effusion, no crepitus, no deformity, normal pulse and normal strength.   Pressure in left upper arm with outward rotation and forward flexion. Right head tilt increases pain in left. AC joint tenderness to palpation. Left cervical para-spinal tenderness to palpation over trapezius. No mid-line tenderness. No decreased strength in left arm or shoulder. No erythema ecchymosis or edema to neck or left shoulder.  Neurological: She has normal strength. No sensory deficit.      Pre-Existing Condition(s):     Assessment:    Condition Improved    Status: Additional Care Required  Permanent Disability:No    Plan: Medication    Diagnostics:      Comments:       Disability Information   Status: Released to Restricted Duty    From:  2019  Through: 2019 Restrictions are: Temporary   Physical Restrictions   Sitting:    Standing:    Stooping:    Bending:      Squatting:    Walking:    Climbin hrs/day Pushin hrs/day   Pullin hrs/day Other:    Reaching Above Shoulder (L): 0 hrs/day Reaching Above Shoulder (R):       Reaching Below Shoulder (L):    Reaching Below Shoulder (R):      Not to exceed Weight Limits   Carrying(hrs):   Weight Limit(lb): < or = to 25 pounds Lifting(hrs):   Weight  Limit(lb): < or = to 25 pounds   Comments: OTC NSAID's; Ibuprofen as directed.  ROM stretching  Temperature therapy: Ice or heat as tolerated.  Follow-up in 5 days for repeat evaluation.        Repetitive Actions   Hands: i.e. Fine Manipulations from Grasping:     Feet: i.e. Operating Foot Controls:     Driving / Operate Machinery:     Physician Name: BO Do Physician Signature: KIRILL Barnett e-Signature: Dr. Alvarez López, Medical Director   Clinic Name / Location: [address]  [place] [address] Clinic Phone Number: Dept: [phone]   Appointment Time: 8:20 Am Visit Start Time: 8:23 AM   Check-In Time:  8:17 Am Visit Discharge Time:  9:45 AM    Original-Treating Physician or Chiropractor    Page 2-Insurer/TPA    Page 3-Employer    Page 4-Employee

## 2019-07-08 NOTE — PROGRESS NOTES
"  Subjective:     Niki Painter is a 24 y.o. female who presents for Shoulder Injury (WC/Fv)                DOI: 7/2/2019: Reported injury to neck while at work. Was looking down while standing up and felt spasm pain to left back near shoulder blade. Denies other employment currently.     Follow up today: Symptoms are improving. Stiffness and strain sensation in left shoulder that radiates down under arm and back of shoulder persists. Has increase ROM in neck. Doing exercises as directed. Not currently taking medications. Pain 5/10, pain increased with laying on area, especially while sleeping. No numbness or tingling.       Shoulder Injury    Pertinent negatives include no tingling.       Social History     Social History   • Marital status: Single     Spouse name: N/A   • Number of children: N/A   • Years of education: N/A     Occupational History   • Not on file.     Social History Main Topics   • Smoking status: Never Smoker   • Smokeless tobacco: Never Used   • Alcohol use No   • Drug use: No   • Sexual activity: Yes     Partners: Male     Birth control/ protection: Pill      Comment: In relationship     Other Topics Concern   • Not on file     Social History Narrative   • No narrative on file        No Known Allergies    Review of Systems   Constitutional: Negative for chills and fever.   Musculoskeletal: Positive for joint pain.   Neurological: Negative for tingling and sensory change.   All other systems reviewed and are negative.       Objective:   /70   Pulse 74   Temp 37 °C (98.6 °F)   Resp 16   Ht 1.676 m (5' 6\")   Wt 68 kg (150 lb)   SpO2 95%   BMI 24.21 kg/m²     Physical Exam   Constitutional: She is oriented to person, place, and time. She appears well-developed. No distress.   HENT:   Head: Normocephalic.   Right Ear: External ear normal.   Left Ear: External ear normal.   Nose: Nose normal.   Mouth/Throat: Oropharynx is clear and moist.   Eyes: Conjunctivae are normal. "   Neck: Neck supple. No JVD present. No tracheal deviation present.   Cardiovascular: Normal rate.    Pulses:       Radial pulses are 2+ on the left side.   Pulmonary/Chest: Effort normal.   Musculoskeletal:        Left shoulder: She exhibits tenderness. She exhibits no bony tenderness, no swelling, no effusion, no crepitus, no deformity, normal pulse and normal strength.   Pressure in left upper arm with outward rotation and forward flexion. Right head tilt increases pain in left. AC joint tenderness to palpation. Left cervical para-spinal tenderness to palpation over trapezius. No mid-line tenderness. No decreased strength in left arm or shoulder. No erythema ecchymosis or edema to neck or left shoulder.       Neurological: She is alert and oriented to person, place, and time. She has normal strength. No sensory deficit.   Skin: Skin is warm and dry. Capillary refill takes less than 2 seconds.   Psychiatric: She has a normal mood and affect. Her behavior is normal. Judgment and thought content normal.       Assessment/Plan:   1. Strain of left shoulder, subsequent encounter    2. Cervical myofascial strain, subsequent encounter  -Note D-39  OTC NSAID's; Ibuprofen as directed.  ROM stretching  Temperature therapy: Ice or heat as tolerated.  Follow-up in 5 days for repeat evaluation      Differential diagnosis, natural history, supportive care, and indications for immediate follow-up discussed.

## 2019-07-10 ASSESSMENT — ENCOUNTER SYMPTOMS
FEVER: 0
CHILLS: 0
TINGLING: 0
SENSORY CHANGE: 0

## 2019-07-15 ENCOUNTER — OCCUPATIONAL MEDICINE (OUTPATIENT)
Dept: URGENT CARE | Facility: PHYSICIAN GROUP | Age: 24
End: 2019-07-15
Payer: COMMERCIAL

## 2019-07-15 VITALS
SYSTOLIC BLOOD PRESSURE: 110 MMHG | DIASTOLIC BLOOD PRESSURE: 70 MMHG | TEMPERATURE: 98.3 F | WEIGHT: 150 LBS | RESPIRATION RATE: 18 BRPM | HEART RATE: 78 BPM | HEIGHT: 66 IN | OXYGEN SATURATION: 97 % | BODY MASS INDEX: 24.11 KG/M2

## 2019-07-15 DIAGNOSIS — S16.1XXD CERVICAL MYOFASCIAL STRAIN, SUBSEQUENT ENCOUNTER: ICD-10-CM

## 2019-07-15 DIAGNOSIS — S46.912D STRAIN OF LEFT SHOULDER, SUBSEQUENT ENCOUNTER: ICD-10-CM

## 2019-07-15 PROCEDURE — 99213 OFFICE O/P EST LOW 20 MIN: CPT | Mod: 29 | Performed by: NURSE PRACTITIONER

## 2019-07-15 ASSESSMENT — ENCOUNTER SYMPTOMS
GASTROINTESTINAL NEGATIVE: 1
NECK PAIN: 1
RESPIRATORY NEGATIVE: 1
CONSTITUTIONAL NEGATIVE: 1
CARDIOVASCULAR NEGATIVE: 1
NEUROLOGICAL NEGATIVE: 1

## 2019-07-15 NOTE — LETTER
[facility]  [address] - [place] [address]  Phone:  [phone] - Fax:  [phone]   [facility] Progress Report and Disability Certification  Date of Service: 7/15/2019   No Show:  No  Date / Time of Next Visit:     Claim Information   Patient Name: Niki Painter  Claim Number:     Employer:   [employer] Date of Injury: 7/2/2019     Insurer / TPA: [employer]  ID / SSN:     Occupation: wear house  Diagnosis: Diagnoses of Strain of left shoulder, subsequent encounter and Cervical myofascial strain, subsequent encounter were pertinent to this visit.    Medical Information   Related to Industrial Injury? Yes    Subjective Complaints:  DOI: 7/2/2019: Patient was at work, looking down while standing up and felt spasm pain to left back near shoulder blade. She was diagnosed with cervical and shoulder strain. Today is her third visit and notes symptoms are improving. She initially had left sided cervical and shoulder stiffness which has improved. Has increase ROM in neck. Denies numbness or tingling. Doing exercises as directed. Not currently taking medications. Pain 0/10 today, admits to significant improvement. She has been on work restrictions and tolerating well.     Objective Findings: A/Ox4. NAD. No midline spinal tenderness. No muscular tenderness or spasms noted to left lateral neck, trapezius or shoulder. Neck has FROM. Upper extremity strength 5/5, N/V intact. Gait steady.    Pre-Existing Condition(s): Denies    Assessment:   Condition Improved    Status: Additional Care Required  Permanent Disability:No    Plan: Medication  Comments:OTC NSAIDS, exercises, ice and heat therapy, full duty, RTC in one week for re-eval.     Diagnostics:   Comments:N/A    Comments:       Disability Information   Status: Released to Full Duty    From:     Through:   Restrictions are:     Physical Restrictions   Sitting:   Standing:    Stooping:    Bending:      Squatting:    Walking:    Climbing:    Pushing:      Pulling:    Other:    Reaching Above Shoulder (L):   Reaching Above Shoulder (R):       Reaching Below Shoulder (L):    Reaching Below Shoulder (R):      Not to exceed Weight Limits   Carrying(hrs):   Weight Limit(lb):   Lifting(hrs):   Weight  Limit(lb):     Comments:      Repetitive Actions   Hands: i.e. Fine Manipulations from Grasping:     Feet: i.e. Operating Foot Controls:     Driving / Operate Machinery:     Physician Name: BO Teague Physician Signature: KIRILL Ronquillo e-Signature: Dr. Alvarez López, Medical Director   Clinic Name / Location: [address]  [place] [address] Clinic Phone Number: Dept: [phone]   Appointment Time: 5:20 Pm Visit Start Time: 5:21 PM   Check-In Time:  5:12 Pm Visit Discharge Time:  5:43 PM    Original-Treating Physician or Chiropractor    Page 2-Insurer/TPA    Page 3-Employer    Page 4-Employee

## 2019-07-16 NOTE — PROGRESS NOTES
"Subjective:      Niki Painter is a 24 y.o. female who presents with Follow-Up (workers comp follow up, DOI 7/2/19. injured left side of neck down to shoulder blade. Patient states that she is feeling a lot better. )      HPI  DOI: 7/2/2019: Patient was at work, looking down while standing up and felt spasm pain to left back near shoulder blade. She was diagnosed with cervical and shoulder strain. Today is her third visit and notes symptoms are improving. She initially had left sided cervical and shoulder stiffness which has improved. Has increase ROM in neck. Denies numbness or tingling. Doing exercises as directed. Not currently taking medications. Pain 0/10 today, admits to significant improvement. She has been on work restrictions and tolerating well.      No past medical history on file.  No past surgical history on file.  Current Outpatient Prescriptions on File Prior to Visit   Medication Sig Dispense Refill   • Famotidine-Ca Carb-Mag Hydrox (PEPCID COMPLETE) -165 MG Chew Tab Take  by mouth.     • sucralfate (CARAFATE) 1 GM Tab Take 1 Tab by mouth 4 Times a Day,Before Meals and at Bedtime. (Patient not taking: Reported on 12/12/2018) 120 Tab 0   • NECON 1/35, 28, 1-35 MG-MCG per tablet        No current facility-administered medications on file prior to visit.      Patient has no known allergies.    Review of Systems   Constitutional: Negative.    HENT: Negative.    Respiratory: Negative.    Cardiovascular: Negative.    Gastrointestinal: Negative.    Genitourinary: Negative.    Musculoskeletal: Positive for neck pain.   Skin: Negative.    Neurological: Negative.           Objective:     /70 (BP Location: Left arm, Patient Position: Sitting, BP Cuff Size: Adult)   Pulse 78   Temp 36.8 °C (98.3 °F) (Temporal)   Resp 18   Ht 1.676 m (5' 6\")   Wt 68 kg (150 lb)   SpO2 97%   BMI 24.21 kg/m²      Physical Exam   Constitutional: She is oriented to person, place, and time. Vital signs " are normal. She appears well-developed and well-nourished. She is active. She does not have a sickly appearance. She does not appear ill. No distress.   HENT:   Head: Normocephalic and atraumatic.   Right Ear: External ear normal.   Left Ear: External ear normal.   Nose: Nose normal.   Mouth/Throat: Oropharynx is clear and moist.   Eyes: Pupils are equal, round, and reactive to light. Conjunctivae are normal. Right eye exhibits no discharge. Left eye exhibits no discharge. No scleral icterus.   Neck: Normal range of motion and full passive range of motion without pain. Neck supple. No JVD present. No spinous process tenderness and no muscular tenderness present. No tracheal deviation and normal range of motion present.   Cardiovascular: Normal rate, regular rhythm, normal heart sounds and intact distal pulses.    Pulmonary/Chest: Effort normal and breath sounds normal. No stridor. No respiratory distress. She has no wheezes.   Musculoskeletal: Normal range of motion. She exhibits no edema, tenderness or deformity.   No midline spinal tenderness. No muscular tenderness or spasms noted to left lateral neck, trapezius or shoulder. Neck has FROM. Upper extremity strength 5/5, N/V intact. Gait steady.    Lymphadenopathy:     She has no cervical adenopathy.   Neurological: She is alert and oriented to person, place, and time. She has normal strength. No cranial nerve deficit or sensory deficit. She exhibits normal muscle tone. Coordination and gait normal. GCS eye subscore is 4. GCS verbal subscore is 5. GCS motor subscore is 6.   Skin: Skin is warm and dry. Capillary refill takes less than 2 seconds. No abrasion, no bruising, no ecchymosis, no laceration and no rash noted. She is not diaphoretic. No erythema. No pallor.   Psychiatric: She has a normal mood and affect. Her behavior is normal. Judgment and thought content normal.   Vitals reviewed.           Assessment/Plan:     1. Strain of left shoulder, subsequent  encounter     2. Cervical myofascial strain, subsequent encounter         OTC NSAIDS, exercises, ice and heat therapy, full duty, RTC in one week for re-eval and potential MMI.   Supportive care, differential diagnoses, and indications for immediate follow-up discussed with patient.   Pathogenesis of diagnosis discussed including typical length and natural progression.   Instructed to return to clinic or nearest emergency department sooner for any change in condition, further concerns, or worsening of symptoms.  Patient states understanding of the plan of care and discharge instructions.        MANUELITO Teague.

## 2020-06-10 ENCOUNTER — HOSPITAL ENCOUNTER (EMERGENCY)
Facility: MEDICAL CENTER | Age: 25
End: 2020-06-10
Attending: EMERGENCY MEDICINE
Payer: COMMERCIAL

## 2020-06-10 VITALS
WEIGHT: 140 LBS | BODY MASS INDEX: 22.5 KG/M2 | RESPIRATION RATE: 16 BRPM | HEART RATE: 79 BPM | HEIGHT: 66 IN | TEMPERATURE: 98.6 F | OXYGEN SATURATION: 97 % | DIASTOLIC BLOOD PRESSURE: 71 MMHG | SYSTOLIC BLOOD PRESSURE: 134 MMHG

## 2020-06-10 DIAGNOSIS — S83.005A DISLOCATION OF LEFT PATELLA, INITIAL ENCOUNTER: ICD-10-CM

## 2020-06-10 PROCEDURE — 99284 EMERGENCY DEPT VISIT MOD MDM: CPT

## 2020-06-10 PROCEDURE — 27560 TREAT KNEECAP DISLOCATION: CPT

## 2020-06-10 PROCEDURE — 96374 THER/PROPH/DIAG INJ IV PUSH: CPT

## 2020-06-10 PROCEDURE — 700111 HCHG RX REV CODE 636 W/ 250 OVERRIDE (IP): Performed by: EMERGENCY MEDICINE

## 2020-06-10 RX ORDER — KETOROLAC TROMETHAMINE 30 MG/ML
15 INJECTION, SOLUTION INTRAMUSCULAR; INTRAVENOUS ONCE
Status: COMPLETED | OUTPATIENT
Start: 2020-06-10 | End: 2020-06-10

## 2020-06-10 RX ADMIN — KETOROLAC TROMETHAMINE 15 MG: 30 INJECTION, SOLUTION INTRAMUSCULAR at 16:52

## 2020-06-10 ASSESSMENT — FIBROSIS 4 INDEX: FIB4 SCORE: 0.26

## 2020-06-10 NOTE — LETTER
"  FORM C-4:  EMPLOYEE’S CLAIM FOR COMPENSATION/ REPORT OF INITIAL TREATMENT  EMPLOYEE’S CLAIM - PROVIDE ALL INFORMATION REQUESTED   First Name Niki Last Name Inocencio Birthdate 1995  Sex Female Claim Number   Home Employee Address [address] [facility]                                     Zip  [address] Height  1.676 m (5' 6\") Weight  63.5 kg (140 lb) [facility]  [address]   Mailing Employee Address [address]   [facility]               Zip  [address] Telephone  [phone]  Primary Language Spoken: ENGLISH   Insurer   Third Party   NATALIE SNOW Employee's Occupation (Job Title) When Injury or Occupational Disease Occurred     Employer's Name : [employer] Telephone [phone]    Employer Address [address] [address] [address] [facility] [address]] Zip [address]   Date of Injury  6/10/2020       Hour of Injury  3:20 PM Date Employer Notified  6/10/2020 Last Day of Work after Injury or Occupational Disease  6/10/2020 Supervisor to Whom Injury Reported  MANUEL   Address or Location of Accident (if applicable) [[address] [place] [address]]   What were you doing at the time of accident? (if applicable) I WAS PUSHING A HEAVY BOX    How did this injury or occupational disease occur? Be specific and answer in detail. Use additional sheet if necessary)  I WAS PUSHING A HEAVY BOX AND TURNED WRONG. IT POPPED OUT OF PLACE.   If you believe that you have an occupational disease, when did you first have knowledge of the disability and it relationship to your employment? 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  I HEREBY AUTHORIZE ANY PHYSICIAN, CHIROPRACTOR, SURGEON, PRACTITIONER, OR OTHER PERSON, ANY HOSPITAL, INCLUDING [facility] OR [facility] H[facility], ANY MEDICAL SERVICE ORGANIZATION, ANY INSURANCE COMPANY, OR OTHER INSTITUTION OR ORGANIZATION TO RELEASE TO EACH OTHER, ANY MEDICAL OR OTHER INFORMATION, INCLUDING BENEFITS PAID OR PAYABLE, PERTINENT TO THIS INJURY OR DISEASE, EXCEPT INFORMATION RELATIVE TO DIAGNOSIS, TREATMENT AND/OR COUNSELING FOR AIDS, PSYCHOLOGICAL CONDITIONS, ALCOHOL OR CONTROLLED SUBSTANCES, FOR WHICH I MUST GIVE SPECIFIC AUTHORIZATION.  A PHOTOSTAT OF THIS AUTHORIZATION SHALL BE AS VALID AS THE ORIGINAL.  Date: 6/10/2020             Place   [facility]        Employee’s Signature   THIS REPORT MUST BE COMPLETED AND MAILED WITHIN 3 WORKING DAYS OF TREATMENT   Place [facility], EMERGENCY DEPT                                                                             Name of Facility [facility]   Date  6/10/2020 Diagnosis  (S83.005A) Dislocation of left patella, initial encounter Is there evidence the injured employee was under the influence of alcohol and/or another controlled substance at the time of accident?   Hour  5:24 PM Description of Injury or Disease  Dislocation of left patella, initial encounter No   Treatment  Patella dislocation reduction  Have you advised the patient to remain off work five days or more?         No   X-Ray Findings    Comments:Not performed If Yes   From Date    To Date      From information given by the employee, together with medical evidence, can you directly connect this injury or occupational disease as job incurred? Yes If No, is employee capable of: Full Duty  No Modified Duty  Yes   Is additional medical care by a physician indicated? Yes If Modified Duty, Specify any Limitations / Restrictions   Must remain in leg splint until evaluated by  "orthopedics within 1 week   Do you know of any previous injury or disease contributing to this condition or occupational disease? Yes  Comments:History of patellar dislocations in the past    Date 6/10/2020 Print Doctor’s Name Gonzalo Pian certify the employer’s copy of this form was mailed on:   Address [address] [place] [address]  [place] [id number] [phone] INSURER’S USE ONLY   Provider’s Tax ID Number [id number] Telephone Dept: [phone]    Doctor’s Signature e-SignGONZALO PINA M.D. Degree: M.D.        Form C-4 (rev.10/07)                                                                         BRIEF DESCRIPTION OF RIGHTS AND BENEFITS  (Pursuant to [place] 616C.050)    Notice of Injury or Occupational Disease (Incident Report Form C-1): If an injury or occupational disease (OD) arises out of and in the course of employment, you must provide written notice to your employer as soon as practicable, but no later than 7 days after the accident or OD. Your employer shall maintain a sufficient supply of the required forms.    Claim for Compensation (Form C-4): If medical treatment is sought, the form C-4 is available at the place of initial treatment. A completed \"Claim for Compensation\" (Form C-4) must be filed within 90 days after an accident or OD. The treating physician or chiropractor must, within 3 working days after treatment, complete and mail to the employer, the employer's insurer and third-party , the Claim for Compensation.    Medical Treatment: If you require medical treatment for your on-the-job injury or OD, you may be required to select a physician or chiropractor from a list provided by your workers’ compensation insurer, if it has contracted with an Organization for Managed Care (MCO) or Preferred Provider Organization (PPO) or providers of health care. If your employer has not entered into a contract with an MCO or PPO, you may select a physician or chiropractor from the Panel of " Physicians and Chiropractors. Any medical costs related to your industrial injury or OD will be paid by your insurer.    Temporary Total Disability (TTD): If your doctor has certified that you are unable to work for a period of at least 5 consecutive days, or 5 cumulative days in a 20-day period, or places restrictions on you that your employer does not accommodate, you may be entitled to TTD compensation.    Temporary Partial Disability (TPD): If the wage you receive upon reemployment is less than the compensation for TTD to which you are entitled, the insurer may be required to pay you TPD compensation to make up the difference. TPD can only be paid for a maximum of 24 months.    Permanent Partial Disability (PPD): When your medical condition is stable and there is an indication of a PPD as a result of your injury or OD, within 30 days, your insurer must arrange for an evaluation by a rating physician or chiropractor to determine the degree of your PPD. The amount of your PPD award depends on the date of injury, the results of the PPD evaluation and your age and wage.    Permanent Total Disability (PTD): If you are medically certified by a treating physician or chiropractor as permanently and totally disabled and have been granted a PTD status by your insurer, you are entitled to receive monthly benefits not to exceed 66 2/3% of your average monthly wage. The amount of your PTD payments is subject to reduction if you previously received a PPD award.    Vocational Rehabilitation Services: You may be eligible for vocational rehabilitation services if you are unable to return to the job due to a permanent physical impairment or permanent restrictions as a result of your injury or occupational disease.    Transportation and Per Ramon Reimbursement: You may be eligible for travel expenses and per ramon associated with medical treatment.    Reopening: You may be able to reopen your claim if your condition worsens after  claim closure.     Appeal Process: If you disagree with a written determination issued by the insurer or the insurer does not respond to your request, you may appeal to the Department of Administration, , by following the instructions contained in your determination letter. You must appeal the determination within 70 days from the date of the determination letter at [address], [address], [place], [place] [address], or [address] [facility] [place] [address], [place], [place] [address]. If you disagree with the  decision, you may appeal to the Department of Administration, . You must file your appeal within 30 days from the date of the  decision letter at [address], [address], [place], [place] [address], or [address], [place], [place] [address]. If you disagree with a decision of an , you may file a petition for judicial review with the District Court. You must do so within 30 days of the Appeal Officer’s decision. You may be represented by an  at your own expense or you may contact the [facility] for possible representation.    [place]  for Injured Workers (NAIW): If you disagree with a  decision, you may request that NAIW represent you without charge at an  Hearing. For information regarding denial of benefits, you may contact the [facility] at: [address], [address], [place] [address], [phone], or [address] [facility] [address] [place] [address], [place] [address], [phone]    To File a Complaint with the Division: If you wish to file a complaint with the  of the Division of Industrial Relations (DIR),  please contact the Workers’ Compensation Section, [address], [place], [place] [address], telephone [phone], or [address], [place], [place] [address], telephone [phone].    For assistance with  Workers’ Compensation Issues: You may contact the Office of the Governor Consumer Health Assistance, [address] [place] [address], [facility] [address], [place] [address], Toll Free 1-482.959.7676, Web site: http://govcha.[facility].nv., E-mail [email or web address].  D-2 (rev. 06/18)              __________________________________________________________________                                    _________________            Employee Name / Signature                                                                                                                            Date

## 2020-06-10 NOTE — ED TRIAGE NOTES
Chief Complaint   Patient presents with   • Knee Pain     Pt. has a hx, pt. presents with left knee deformity after slipping at work.

## 2020-06-11 ENCOUNTER — OCCUPATIONAL MEDICINE (OUTPATIENT)
Dept: OCCUPATIONAL MEDICINE | Facility: CLINIC | Age: 25
End: 2020-06-11
Payer: COMMERCIAL

## 2020-06-11 VITALS
HEART RATE: 78 BPM | BODY MASS INDEX: 22.5 KG/M2 | WEIGHT: 140 LBS | TEMPERATURE: 97.5 F | OXYGEN SATURATION: 98 % | HEIGHT: 66 IN | DIASTOLIC BLOOD PRESSURE: 68 MMHG | SYSTOLIC BLOOD PRESSURE: 112 MMHG

## 2020-06-11 DIAGNOSIS — S83.005D DISLOCATION OF LEFT PATELLA, SUBSEQUENT ENCOUNTER: ICD-10-CM

## 2020-06-11 PROCEDURE — 99213 OFFICE O/P EST LOW 20 MIN: CPT | Performed by: NURSE PRACTITIONER

## 2020-06-11 ASSESSMENT — FIBROSIS 4 INDEX: FIB4 SCORE: 0.26

## 2020-06-11 ASSESSMENT — ENCOUNTER SYMPTOMS
CARDIOVASCULAR NEGATIVE: 1
RESPIRATORY NEGATIVE: 1
MYALGIAS: 1
CONSTITUTIONAL NEGATIVE: 1
NEUROLOGICAL NEGATIVE: 1
PSYCHIATRIC NEGATIVE: 1

## 2020-06-11 NOTE — PROGRESS NOTES
"Subjective:      Niki Painter is a 24 y.o. female who presents with Other (WC DOI 6/10/20 left knee, feeling better, room 10)      DOI 6/10/20: Niki Painter is a 24 y.o. female who presents for evaluation of left knee injury with deformity with a history of patellar dislocations, she was at work when she went to pivot on an extended leg and felt an immediate pain in the familiar deformity. \"I was pushing a heavy box and turned wrong. It popped out of place\".     Today here for follow-up visit from a knee dislocation sustained at work yesterday. The knee was reduced at the ER and patient was placed in a posterior splint. Today patient reports no improvement. Pain is described as soreness. Patient denies numbness or decreased strength. Patient has taken with Tylenol and Ibuprofen with moderate relief. Patient is using ice and elevating for moderate relief. Patient is using a knee brace and crutches to ambulate. Patient has not been back to work since the incident. Patient will need an Orthopedic referral for further evaluation and management. Plan of care discussed with patient.      HPI    Review of Systems   Constitutional: Negative.    Respiratory: Negative.    Cardiovascular: Negative.    Musculoskeletal: Positive for joint pain and myalgias.   Skin: Negative.    Neurological: Negative.    Psychiatric/Behavioral: Negative.         ROS: All systems were reviewed on intake form, form was reviewed and signed. See scanned documents in media. Pertinent positives and negatives included in HPI.    PMH: No pertinent past medical history to this problem  MEDS: Medications were reviewed in Epic  ALLERGIES: No Known Allergies  SOCHX: Works as  at [employer]  FH: No pertinent family history to this problem   Objective:     /68   Pulse 78   Temp 36.4 °C (97.5 °F)   Ht 1.676 m (5' 6\")   Wt 63.5 kg (140 lb)   LMP 05/15/2020 (Approximate)   SpO2 98%   BMI 22.60 kg/m²      Physical " Exam  Constitutional:       General: She is not in acute distress.     Appearance: Normal appearance. She is not ill-appearing.   Cardiovascular:      Rate and Rhythm: Normal rate and regular rhythm.   Musculoskeletal:         General: Tenderness and signs of injury present. No swelling or deformity.   Skin:     General: Skin is warm and dry.      Capillary Refill: Capillary refill takes less than 2 seconds.      Findings: No bruising or erythema.   Neurological:      General: No focal deficit present.      Mental Status: She is alert and oriented to person, place, and time.      Cranial Nerves: No cranial nerve deficit.      Sensory: No sensory deficit.      Motor: No weakness.      Gait: Gait abnormal.      Deep Tendon Reflexes: Reflexes normal.   Psychiatric:         Mood and Affect: Mood normal.         Behavior: Behavior normal.         Left knee: There is a posterior splint with a little bit of flexion in place. (copied from ER visit) Deformity of the left knee with lateral deviation of the patella, the knee is held in a slight flexed position.    Distal neurovascular intact. Antalgic gait. Distal pulses 2+       Assessment/Plan:       1. Dislocation of left patella, subsequent encounter    - REFERRAL TO ORTHOPEDICS    Follow-up in 1 week,if not seen by Orthopedics   Restricted duty, per Orthopedics   Orthopedics referral placed, transfer care   Continue with ice and elevation as needed   Continue with OTC Tylenol/Ibuprofen as needed for symptoms   Keep knee splint in place, until cleared by Orthopedics   Use crutches to ambulate

## 2020-06-11 NOTE — LETTER
"   [facility]  [address],   [address] [address]  Phone:  [phone] - Fax:  [phone]   [facility] Progress Report and Disability Certification  Date of Service: 6/11/2020   No Show:  No  Date / Time of Next Visit:  Discharged/Transfer care to Orthopedics   Claim Information   Patient Name: Niki Painter  Claim Number:     Employer:   [employer] Date of Injury: 6/10/2020     Insurer / TPA: [employer]  ID / SSN:     Occupation:   Diagnosis: The encounter diagnosis was Dislocation of left patella, subsequent encounter.    Medical Information   Related to Industrial Injury? Yes    Subjective Complaints:  DOI 6/10/20: Niki Painter is a 24 y.o. female who presents for evaluation of left knee injury with deformity with a history of patellar dislocations, she was at work when she went to pivot on an extended leg and felt an immediate pain in the familiar deformity. \"I was pushing a heavy box and turned wrong. It popped out of place\".     Today here for follow-up visit from a knee dislocation sustained at work yesterday. The knee was reduced at the ER and patient was placed in a posterior splint. Today patient reports no improvement. Pain is described as soreness. Patient denies numbness or decreased strength. Patient has taken with Tylenol and Ibuprofen with moderate relief. Patient is using ice and elevating for moderate relief. Patient is using a knee brace and crutches to ambulate. Patient has not been back to work since the incident. Patient will need an Orthopedic referral for further evaluation and management. Plan of care discussed with patient.    Objective Findings: Left knee: There is a posterior splint with a little bit of flexion in place. (copied from ER visit) Deformity of the left knee with lateral deviation of the patella, the knee is held in a slight flexed position.    Distal neurovascular " intact. Antalgic gait. Distal pulses 2+   Pre-Existing Condition(s):     Assessment:   Condition Same    Status: Discharged / Care Transfer  Permanent Disability:No    Plan: Transfer Care    Diagnostics:      Comments:  Follow-up in 1 week,if not seen by Orthopedics  Restricted duty, per Orthopedics  Orthopedics referral placed, transfer care   Continue with ice and elevation as needed   Continue with OTC Tylenol/Ibuprofen as needed for symptoms  Keep knee splint in   place, until cleared by Orthopedics  Use crutches to ambulate     Disability Information   Status: Released to Restricted Duty    From:  2020  Through:   Restrictions are:     Physical Restrictions   Sitting:    Standing:  < or = to 1 hr/day Stooping:    Bending:      Squattin hrs/day Walking:  < or = to 1 hr/day Climbing:    Pushing:      Pulling:    Other:    Reaching Above Shoulder (L):   Reaching Above Shoulder (R):       Reaching Below Shoulder (L):    Reaching Below Shoulder (R):      Not to exceed Weight Limits   Carrying(hrs):   Weight Limit(lb): < or = to 10 pounds Lifting(hrs):   Weight  Limit(lb): < or = to 10 pounds   Comments:      Repetitive Actions   Hands: i.e. Fine Manipulations from Grasping:     Feet: i.e. Operating Foot Controls:     Driving / Operate Machinery:     Physician Name: BO Cleary Physician Signature:   e-Signature: Dr. Alvarez López, Medical Director   Clinic Name / Location: [address],   [address] [place] [address] Clinic Phone Number: Dept: [phone]   Appointment Time: 9:45 Am Visit Start Time: 9:19 AM   Check-In Time:  9:15 Am Visit Discharge Time:  9:42 am    Original-Treating Physician or Chiropractor    Page 2-Insurer/TPA    Page 3-Employer    Page 4-Employee

## 2020-06-11 NOTE — ED PROVIDER NOTES
"ED Provider Note    CHIEF COMPLAINT  Chief Complaint   Patient presents with   • Knee Pain     Pt. has a hx, pt. presents with left knee deformity after slipping at work.       HPI  Niki Painter is a 24 y.o. female who presents for evaluation of left knee injury with deformity with a history of patellar dislocations, she was at work when she went to pivot on an extended leg and felt an immediate pain in the familiar deformity.  No weakness numbness or tingling, she has no other injuries.    REVIEW OF SYSTEMS  Negative for weakness, numbness, back pain    PAST MEDICAL HISTORY       SOCIAL HISTORY  Social History     Tobacco Use   • Smoking status: Never Smoker   • Smokeless tobacco: Never Used   Substance and Sexual Activity   • Alcohol use: No   • Drug use: No   • Sexual activity: Yes     Partners: Male     Birth control/protection: Pill     Comment: In relationship       SURGICAL HISTORY  patient denies any surgical history    CURRENT MEDICATIONS  I personally reviewed the medication list in the charting documentation.     ALLERGIES  No Known Allergies    PHYSICAL EXAM  VITAL SIGNS: /67   Pulse 79   Temp 36.9 °C (98.4 °F) (Oral)   Resp (!) 23   Ht 1.676 m (5' 6\")   Wt 63.5 kg (140 lb)   LMP 05/15/2020 (Approximate)   SpO2 97%   BMI 22.60 kg/m²   Constitutional: Alert in no apparent distress.  HENT: No signs of trauma.   Eyes: Conjunctiva normal, Non-icteric.   Chest: Normal nonlabored respirations  Skin: No erythema, No rash.   Musculoskeletal: Deformity of the left knee with lateral deviation of the patella, the knee is held in a slight flexed position, neurovascular intact distally.  Neurologic: Alert, No focal deficits noted.   Psychiatric: Affect normal, Judgment normal.    DIAGNOSTIC STUDIES / PROCEDURES    Joint Reduction Procedure Note    Indication: Patellar dislocation    Consent: The patient provided verbal consent for this procedure.    Procedure: The pre-reduction exam showed " distal perfusion & neurologic function to be normal. The patient was placed in the sitting position. Anesthesia/pain control obtained with prehospital fentanyl. Reduction of the left patella was performed by rapid knee extension and medial pressure on the patella. Post reduction films not indicated. A post-reduction exam revealed distal perfusion & neurologic function to be normal. The affected area was immobilized with posterior splint with a small degree of flexion.    The patient tolerated the procedure well.    Complications: None        COURSE & MEDICAL DECISION MAKING  Pertinent Labs & Imaging studies reviewed. (See chart for details)    Encounter Summary: This is a 24 y.o. female with a patella dislocation sustained at work, history of the same in the past, this was reduced at the bedside, she has been placed in a posterior splint with a little bit of flexion, given crutches she will follow-up outpatient with medicine and orthopedics.  I treated her with some Toradol here in the emergency department, she did receive fentanyl on the ambulance.      DISPOSITION: Discharge Home      FINAL IMPRESSION  1. Dislocation of left patella, initial encounter        This dictation was created using voice recognition software. The accuracy of the dictation is limited to the abilities of the software. I expect there may be some errors of grammar and possibly content. The nursing notes were reviewed and certain aspects of this information were incorporated into this note.    Electronically signed by: Gonzalo Pina M.D., 6/10/2020 5:04 PM

## 2020-06-11 NOTE — ED NOTES
Jose Feldman at bedside applying left leg posterior splint with slight flexion, pt. Also provided with crutches. Pt. Tolerating well.

## 2020-06-11 NOTE — ED NOTES
Pt. Discharged at this time, pt. Denies questions or concerns prior to discharge. Pt. Provided with AVS paperwork and teaching. Pt. Wheeled to ED exit in wheelchair.

## 2021-03-26 ENCOUNTER — OFFICE VISIT (OUTPATIENT)
Dept: URGENT CARE | Facility: PHYSICIAN GROUP | Age: 26
End: 2021-03-26
Payer: COMMERCIAL

## 2021-03-26 ENCOUNTER — APPOINTMENT (OUTPATIENT)
Dept: URGENT CARE | Facility: PHYSICIAN GROUP | Age: 26
End: 2021-03-26
Payer: COMMERCIAL

## 2021-03-26 VITALS
RESPIRATION RATE: 16 BRPM | WEIGHT: 149 LBS | OXYGEN SATURATION: 100 % | DIASTOLIC BLOOD PRESSURE: 78 MMHG | HEIGHT: 66 IN | SYSTOLIC BLOOD PRESSURE: 116 MMHG | TEMPERATURE: 98.6 F | HEART RATE: 82 BPM | BODY MASS INDEX: 23.95 KG/M2

## 2021-03-26 DIAGNOSIS — L50.9 LOCALIZED HIVES: ICD-10-CM

## 2021-03-26 PROCEDURE — 99214 OFFICE O/P EST MOD 30 MIN: CPT | Performed by: PHYSICIAN ASSISTANT

## 2021-03-26 RX ORDER — PREDNISONE 10 MG/1
40 TABLET ORAL DAILY
Qty: 12 TABLET | Refills: 0 | Status: SHIPPED | OUTPATIENT
Start: 2021-03-26 | End: 2021-03-29

## 2021-03-26 RX ORDER — METHYLPREDNISOLONE SODIUM SUCCINATE 125 MG/2ML
62.5 INJECTION, POWDER, LYOPHILIZED, FOR SOLUTION INTRAMUSCULAR; INTRAVENOUS ONCE
Status: COMPLETED | OUTPATIENT
Start: 2021-03-26 | End: 2021-03-26

## 2021-03-26 RX ADMIN — METHYLPREDNISOLONE SODIUM SUCCINATE 62.5 MG: 125 INJECTION, POWDER, LYOPHILIZED, FOR SOLUTION INTRAMUSCULAR; INTRAVENOUS at 16:31

## 2021-03-26 ASSESSMENT — ENCOUNTER SYMPTOMS
CHILLS: 0
EYE PAIN: 0
NAUSEA: 0
DIARRHEA: 0
COUGH: 0
MYALGIAS: 0
ABDOMINAL PAIN: 0
SORE THROAT: 0
VOMITING: 0
SHORTNESS OF BREATH: 0
FEVER: 0
CONSTIPATION: 0
HEADACHES: 0

## 2021-03-27 NOTE — PROGRESS NOTES
"Subjective:   Niki Painter is a 25 y.o. female who presents for Rash (Pt reports a rash on her neck that is itchy, red. Onset 3 days. )      Is a 25-year-old female who presents to urgent care complaining of the rash on the anterior aspect of her neck that she notices is quite itchy with raised lesions of last 3 days.  She speculates this could be due to a bug bite.  She has not tried any any interventions that seem to help.  This is a new problem for her.  She does have a remote history of hives.  She denies any new allergens, detergents, soaps      Review of Systems   Constitutional: Negative for chills and fever.   HENT: Negative for congestion, ear pain and sore throat.    Eyes: Negative for pain.   Respiratory: Negative for cough and shortness of breath.    Cardiovascular: Negative for chest pain.   Gastrointestinal: Negative for abdominal pain, constipation, diarrhea, nausea and vomiting.   Genitourinary: Negative for dysuria.   Musculoskeletal: Negative for myalgias.   Skin: Positive for itching and rash.   Neurological: Negative for headaches.       Medications, Allergies, and current problem list reviewed today in Epic.     Objective:     /78 (BP Location: Left arm, Patient Position: Sitting, BP Cuff Size: Adult)   Pulse 82   Temp 37 °C (98.6 °F) (Temporal)   Resp 16   Ht 1.676 m (5' 6\")   Wt 67.6 kg (149 lb)   SpO2 100%     Physical Exam  Vitals reviewed.   Constitutional:       Appearance: Normal appearance.   HENT:      Head: Normocephalic and atraumatic.      Right Ear: External ear normal.      Left Ear: External ear normal.      Nose: Nose normal.      Mouth/Throat:      Mouth: Mucous membranes are moist.   Eyes:      Conjunctiva/sclera: Conjunctivae normal.   Cardiovascular:      Rate and Rhythm: Normal rate.   Pulmonary:      Effort: Pulmonary effort is normal.   Skin:     General: Skin is warm and dry.      Capillary Refill: Capillary refill takes less than 2 seconds.      " Findings: Rash (Patchy areas of erythema with central raised lesions) present.   Neurological:      Mental Status: She is alert and oriented to person, place, and time.         Assessment/Plan:     Diagnosis and associated orders:     1. Localized hives  methylPREDNISolone sod succ (SOLU-MEDROL) 125 MG injection 62.5 mg    predniSONE (DELTASONE) 10 MG Tab      Comments/MDM:     • History and physical consistent with a histamine base skin reaction to an unknown trigger  • Advised use of antihistamines  • Advised elimination of any possible suspect triggers  • Return if not improving or getting worse         Differential diagnosis, natural history, supportive care, and indications for immediate follow-up discussed.    Advised the patient to follow-up with the primary care physician for recheck, reevaluation, and consideration of further management.    Please note that this dictation was created using voice recognition software. I have made a reasonable attempt to correct obvious errors, but I expect that there are errors of grammar and possibly content that I did not discover before finalizing the note.    This note was electronically signed by Lele Felix PA-C